# Patient Record
Sex: FEMALE | Race: WHITE | NOT HISPANIC OR LATINO | Employment: OTHER | URBAN - METROPOLITAN AREA
[De-identification: names, ages, dates, MRNs, and addresses within clinical notes are randomized per-mention and may not be internally consistent; named-entity substitution may affect disease eponyms.]

---

## 2023-03-01 LAB
ANION GAP IN SER/PLAS: 14 MMOL/L (ref 10–20)
CALCIUM (MG/DL) IN SER/PLAS: 8.7 MG/DL (ref 8.6–10.6)
CARBON DIOXIDE, TOTAL (MMOL/L) IN SER/PLAS: 43 MMOL/L (ref 21–32)
CHLORIDE (MMOL/L) IN SER/PLAS: 84 MMOL/L (ref 98–107)
CREATININE (MG/DL) IN SER/PLAS: 0.76 MG/DL (ref 0.5–1.05)
ERYTHROCYTE DISTRIBUTION WIDTH (RATIO) BY AUTOMATED COUNT: 14 % (ref 11.5–14.5)
ERYTHROCYTE MEAN CORPUSCULAR HEMOGLOBIN CONCENTRATION (G/DL) BY AUTOMATED: 29.8 G/DL (ref 32–36)
ERYTHROCYTE MEAN CORPUSCULAR VOLUME (FL) BY AUTOMATED COUNT: 96 FL (ref 80–100)
ERYTHROCYTES (10*6/UL) IN BLOOD BY AUTOMATED COUNT: 3.62 X10E12/L (ref 4–5.2)
GFR FEMALE: 77 ML/MIN/1.73M2
GLUCOSE (MG/DL) IN SER/PLAS: 123 MG/DL (ref 74–99)
HEMATOCRIT (%) IN BLOOD BY AUTOMATED COUNT: 34.9 % (ref 36–46)
HEMOGLOBIN (G/DL) IN BLOOD: 10.4 G/DL (ref 12–16)
LEUKOCYTES (10*3/UL) IN BLOOD BY AUTOMATED COUNT: 7.1 X10E9/L (ref 4.4–11.3)
NRBC (PER 100 WBCS) BY AUTOMATED COUNT: 0 /100 WBC (ref 0–0)
PLATELETS (10*3/UL) IN BLOOD AUTOMATED COUNT: 222 X10E9/L (ref 150–450)
POTASSIUM (MMOL/L) IN SER/PLAS: 3.8 MMOL/L (ref 3.5–5.3)
SODIUM (MMOL/L) IN SER/PLAS: 137 MMOL/L (ref 136–145)
THYROTROPIN (MIU/L) IN SER/PLAS BY DETECTION LIMIT <= 0.05 MIU/L: 3.39 MIU/L (ref 0.44–3.98)
UREA NITROGEN (MG/DL) IN SER/PLAS: 19 MG/DL (ref 6–23)

## 2024-01-13 ENCOUNTER — APPOINTMENT (OUTPATIENT)
Dept: CARDIOLOGY | Facility: HOSPITAL | Age: 84
DRG: 871 | End: 2024-01-13
Payer: MEDICARE

## 2024-01-13 ENCOUNTER — HOSPITAL ENCOUNTER (INPATIENT)
Facility: HOSPITAL | Age: 84
LOS: 8 days | Discharge: HOSPICE | DRG: 871 | End: 2024-01-21
Attending: STUDENT IN AN ORGANIZED HEALTH CARE EDUCATION/TRAINING PROGRAM | Admitting: INTERNAL MEDICINE
Payer: MEDICARE

## 2024-01-13 ENCOUNTER — APPOINTMENT (OUTPATIENT)
Dept: RADIOLOGY | Facility: HOSPITAL | Age: 84
DRG: 871 | End: 2024-01-13
Payer: MEDICARE

## 2024-01-13 DIAGNOSIS — J44.1 COPD EXACERBATION (MULTI): ICD-10-CM

## 2024-01-13 DIAGNOSIS — J18.9 PNEUMONIA DUE TO INFECTIOUS ORGANISM, UNSPECIFIED LATERALITY, UNSPECIFIED PART OF LUNG: ICD-10-CM

## 2024-01-13 DIAGNOSIS — R06.02 SHORTNESS OF BREATH: Primary | ICD-10-CM

## 2024-01-13 DIAGNOSIS — J96.02 ACUTE RESPIRATORY FAILURE WITH HYPERCAPNIA (MULTI): ICD-10-CM

## 2024-01-13 LAB
ALBUMIN SERPL BCP-MCNC: 3.6 G/DL (ref 3.4–5)
ALP SERPL-CCNC: 66 U/L (ref 33–136)
ALT SERPL W P-5'-P-CCNC: 6 U/L (ref 7–45)
ANION GAP SERPL CALC-SCNC: ABNORMAL MMOL/L
ARTERIAL PATENCY WRIST A: POSITIVE
AST SERPL W P-5'-P-CCNC: 16 U/L (ref 9–39)
BASE EXCESS BLDA CALC-SCNC: 33.8 MMOL/L (ref -2–3)
BASE EXCESS BLDV CALC-SCNC: 31.3 MMOL/L (ref -2–3)
BASOPHILS # BLD AUTO: 0.01 X10*3/UL (ref 0–0.1)
BASOPHILS NFR BLD AUTO: 0.2 %
BILIRUB SERPL-MCNC: 0.5 MG/DL (ref 0–1.2)
BODY TEMPERATURE: 37 DEGREES CELSIUS
BODY TEMPERATURE: 37 DEGREES CELSIUS
BUN SERPL-MCNC: 17 MG/DL (ref 6–23)
CALCIUM SERPL-MCNC: 8.3 MG/DL (ref 8.6–10.3)
CARDIAC TROPONIN I PNL SERPL HS: 15 NG/L (ref 0–13)
CARDIAC TROPONIN I PNL SERPL HS: 15 NG/L (ref 0–13)
CHLORIDE SERPL-SCNC: 83 MMOL/L (ref 98–107)
CO2 SERPL-SCNC: >45 MMOL/L (ref 21–32)
CREAT SERPL-MCNC: 0.76 MG/DL (ref 0.5–1.05)
CRITICAL CALL TIME: 1455
CRITICAL CALL TIME: 1648
CRITICAL CALLED BY: ABNORMAL
CRITICAL CALLED BY: ABNORMAL
CRITICAL CALLED TO: ABNORMAL
CRITICAL CALLED TO: ABNORMAL
CRITICAL READ BACK: ABNORMAL
CRITICAL READ BACK: ABNORMAL
EGFRCR SERPLBLD CKD-EPI 2021: 77 ML/MIN/1.73M*2
EOSINOPHIL # BLD AUTO: 0 X10*3/UL (ref 0–0.4)
EOSINOPHIL NFR BLD AUTO: 0 %
EPAP CMH2O: 5 CM H2O
ERYTHROCYTE [DISTWIDTH] IN BLOOD BY AUTOMATED COUNT: 15 % (ref 11.5–14.5)
FLUAV RNA RESP QL NAA+PROBE: NOT DETECTED
FLUBV RNA RESP QL NAA+PROBE: NOT DETECTED
FREQUENCY (BPM): 18 BPM
GLUCOSE SERPL-MCNC: 103 MG/DL (ref 74–99)
HCO3 BLDA-SCNC: 67.9 MMOL/L (ref 22–26)
HCO3 BLDV-SCNC: 65.4 MMOL/L (ref 22–26)
HCT VFR BLD AUTO: 39.8 % (ref 36–46)
HGB BLD-MCNC: 11.5 G/DL (ref 12–16)
IMM GRANULOCYTES # BLD AUTO: 0.01 X10*3/UL (ref 0–0.5)
IMM GRANULOCYTES NFR BLD AUTO: 0.2 % (ref 0–0.9)
INHALED O2 CONCENTRATION: 100 %
INHALED O2 CONCENTRATION: 40 %
INSPIRATORY TIME: 1
IPAP CMH2O: 15 CM H2O
LYMPHOCYTES # BLD AUTO: 0.24 X10*3/UL (ref 0.8–3)
LYMPHOCYTES NFR BLD AUTO: 5.4 %
MCH RBC QN AUTO: 29.6 PG (ref 26–34)
MCHC RBC AUTO-ENTMCNC: 28.9 G/DL (ref 32–36)
MCV RBC AUTO: 102 FL (ref 80–100)
MONOCYTES # BLD AUTO: 0.3 X10*3/UL (ref 0.05–0.8)
MONOCYTES NFR BLD AUTO: 6.8 %
NEUTROPHILS # BLD AUTO: 3.86 X10*3/UL (ref 1.6–5.5)
NEUTROPHILS NFR BLD AUTO: 87.4 %
NRBC BLD-RTO: 0 /100 WBCS (ref 0–0)
OXYHGB MFR BLDA: 96.6 % (ref 94–98)
OXYHGB MFR BLDV: 44.3 % (ref 45–75)
PCO2 BLDA: 123 MM HG (ref 38–42)
PCO2 BLDV: 124 MM HG (ref 41–51)
PH BLDA: 7.35 PH (ref 7.38–7.42)
PH BLDV: 7.33 PH (ref 7.33–7.43)
PLATELET # BLD AUTO: 194 X10*3/UL (ref 150–450)
PO2 BLDA: 335 MM HG (ref 85–95)
PO2 BLDV: 31 MM HG (ref 35–45)
POTASSIUM SERPL-SCNC: 3.9 MMOL/L (ref 3.5–5.3)
PROT SERPL-MCNC: 7.3 G/DL (ref 6.4–8.2)
RBC # BLD AUTO: 3.89 X10*6/UL (ref 4–5.2)
SAO2 % BLDA: 100 % (ref 94–100)
SAO2 % BLDV: 46 % (ref 45–75)
SARS-COV-2 RNA RESP QL NAA+PROBE: DETECTED
SODIUM SERPL-SCNC: 143 MMOL/L (ref 136–145)
SPECIMEN DRAWN FROM PATIENT: ABNORMAL
SPONTANEOUS TIDAL VOLUME: 271 ML
TEST COMMENT: ABNORMAL
TOTAL MINUTE VOLUME: 4.4 LITER
VENTILATOR MODE: ABNORMAL
VENTILATOR RATE: 8 BPM
WBC # BLD AUTO: 4.4 X10*3/UL (ref 4.4–11.3)

## 2024-01-13 PROCEDURE — 36600 WITHDRAWAL OF ARTERIAL BLOOD: CPT

## 2024-01-13 PROCEDURE — 71045 X-RAY EXAM CHEST 1 VIEW: CPT

## 2024-01-13 PROCEDURE — 80053 COMPREHEN METABOLIC PANEL: CPT | Performed by: STUDENT IN AN ORGANIZED HEALTH CARE EDUCATION/TRAINING PROGRAM

## 2024-01-13 PROCEDURE — 84484 ASSAY OF TROPONIN QUANT: CPT | Performed by: STUDENT IN AN ORGANIZED HEALTH CARE EDUCATION/TRAINING PROGRAM

## 2024-01-13 PROCEDURE — 2060000001 HC INTERMEDIATE ICU ROOM DAILY

## 2024-01-13 PROCEDURE — 2500000004 HC RX 250 GENERAL PHARMACY W/ HCPCS (ALT 636 FOR OP/ED): Performed by: STUDENT IN AN ORGANIZED HEALTH CARE EDUCATION/TRAINING PROGRAM

## 2024-01-13 PROCEDURE — 94640 AIRWAY INHALATION TREATMENT: CPT

## 2024-01-13 PROCEDURE — 96365 THER/PROPH/DIAG IV INF INIT: CPT

## 2024-01-13 PROCEDURE — 36415 COLL VENOUS BLD VENIPUNCTURE: CPT | Performed by: STUDENT IN AN ORGANIZED HEALTH CARE EDUCATION/TRAINING PROGRAM

## 2024-01-13 PROCEDURE — 87040 BLOOD CULTURE FOR BACTERIA: CPT | Mod: PARLAB | Performed by: STUDENT IN AN ORGANIZED HEALTH CARE EDUCATION/TRAINING PROGRAM

## 2024-01-13 PROCEDURE — 96375 TX/PRO/DX INJ NEW DRUG ADDON: CPT

## 2024-01-13 PROCEDURE — 96367 TX/PROPH/DG ADDL SEQ IV INF: CPT

## 2024-01-13 PROCEDURE — 87636 SARSCOV2 & INF A&B AMP PRB: CPT | Performed by: STUDENT IN AN ORGANIZED HEALTH CARE EDUCATION/TRAINING PROGRAM

## 2024-01-13 PROCEDURE — 85025 COMPLETE CBC W/AUTO DIFF WBC: CPT | Performed by: STUDENT IN AN ORGANIZED HEALTH CARE EDUCATION/TRAINING PROGRAM

## 2024-01-13 PROCEDURE — XW033E5 INTRODUCTION OF REMDESIVIR ANTI-INFECTIVE INTO PERIPHERAL VEIN, PERCUTANEOUS APPROACH, NEW TECHNOLOGY GROUP 5: ICD-10-PCS

## 2024-01-13 PROCEDURE — 93005 ELECTROCARDIOGRAM TRACING: CPT

## 2024-01-13 PROCEDURE — 71045 X-RAY EXAM CHEST 1 VIEW: CPT | Performed by: RADIOLOGY

## 2024-01-13 PROCEDURE — 96372 THER/PROPH/DIAG INJ SC/IM: CPT

## 2024-01-13 PROCEDURE — 86738 MYCOPLASMA ANTIBODY: CPT

## 2024-01-13 PROCEDURE — 2500000002 HC RX 250 W HCPCS SELF ADMINISTERED DRUGS (ALT 637 FOR MEDICARE OP, ALT 636 FOR OP/ED): Performed by: STUDENT IN AN ORGANIZED HEALTH CARE EDUCATION/TRAINING PROGRAM

## 2024-01-13 PROCEDURE — 3E0333Z INTRODUCTION OF ANTI-INFLAMMATORY INTO PERIPHERAL VEIN, PERCUTANEOUS APPROACH: ICD-10-PCS

## 2024-01-13 PROCEDURE — 99285 EMERGENCY DEPT VISIT HI MDM: CPT | Performed by: STUDENT IN AN ORGANIZED HEALTH CARE EDUCATION/TRAINING PROGRAM

## 2024-01-13 PROCEDURE — 2500000005 HC RX 250 GENERAL PHARMACY W/O HCPCS: Mod: JZ

## 2024-01-13 PROCEDURE — 84145 PROCALCITONIN (PCT): CPT | Mod: PARLAB

## 2024-01-13 PROCEDURE — 94660 CPAP INITIATION&MGMT: CPT

## 2024-01-13 PROCEDURE — 82805 BLOOD GASES W/O2 SATURATION: CPT | Performed by: STUDENT IN AN ORGANIZED HEALTH CARE EDUCATION/TRAINING PROGRAM

## 2024-01-13 PROCEDURE — 2500000004 HC RX 250 GENERAL PHARMACY W/ HCPCS (ALT 636 FOR OP/ED)

## 2024-01-13 PROCEDURE — 5A09357 ASSISTANCE WITH RESPIRATORY VENTILATION, LESS THAN 24 CONSECUTIVE HOURS, CONTINUOUS POSITIVE AIRWAY PRESSURE: ICD-10-PCS | Performed by: STUDENT IN AN ORGANIZED HEALTH CARE EDUCATION/TRAINING PROGRAM

## 2024-01-13 RX ORDER — DEXAMETHASONE SODIUM PHOSPHATE 10 MG/ML
6 INJECTION INTRAMUSCULAR; INTRAVENOUS EVERY 24 HOURS
Status: DISCONTINUED | OUTPATIENT
Start: 2024-01-13 | End: 2024-01-14

## 2024-01-13 RX ORDER — ACETAMINOPHEN 325 MG/1
650 TABLET ORAL EVERY 4 HOURS PRN
Status: DISCONTINUED | OUTPATIENT
Start: 2024-01-13 | End: 2024-01-21 | Stop reason: HOSPADM

## 2024-01-13 RX ORDER — GUAIFENESIN 600 MG/1
600 TABLET, EXTENDED RELEASE ORAL 2 TIMES DAILY PRN
Status: DISCONTINUED | OUTPATIENT
Start: 2024-01-13 | End: 2024-01-21 | Stop reason: HOSPADM

## 2024-01-13 RX ORDER — HEPARIN SODIUM 5000 [USP'U]/ML
5000 INJECTION, SOLUTION INTRAVENOUS; SUBCUTANEOUS EVERY 8 HOURS
Status: DISCONTINUED | OUTPATIENT
Start: 2024-01-13 | End: 2024-01-16

## 2024-01-13 RX ORDER — POLYETHYLENE GLYCOL 3350 17 G/17G
17 POWDER, FOR SOLUTION ORAL AS NEEDED
COMMUNITY
Start: 2019-10-29

## 2024-01-13 RX ORDER — FUROSEMIDE 40 MG/1
80 TABLET ORAL DAILY
Status: DISCONTINUED | OUTPATIENT
Start: 2024-01-13 | End: 2024-01-21 | Stop reason: HOSPADM

## 2024-01-13 RX ORDER — IPRATROPIUM BROMIDE AND ALBUTEROL SULFATE 2.5; .5 MG/3ML; MG/3ML
3 SOLUTION RESPIRATORY (INHALATION) EVERY 4 HOURS
COMMUNITY
Start: 2019-03-06

## 2024-01-13 RX ORDER — IPRATROPIUM BROMIDE AND ALBUTEROL SULFATE 2.5; .5 MG/3ML; MG/3ML
3 SOLUTION RESPIRATORY (INHALATION) EVERY 2 HOUR PRN
Status: DISCONTINUED | OUTPATIENT
Start: 2024-01-13 | End: 2024-01-21 | Stop reason: HOSPADM

## 2024-01-13 RX ORDER — FUROSEMIDE 40 MG/1
80 TABLET ORAL DAILY
COMMUNITY

## 2024-01-13 RX ORDER — ACETAMINOPHEN 160 MG/5ML
650 SOLUTION ORAL EVERY 4 HOURS PRN
Status: DISCONTINUED | OUTPATIENT
Start: 2024-01-13 | End: 2024-01-21 | Stop reason: HOSPADM

## 2024-01-13 RX ORDER — IPRATROPIUM BROMIDE AND ALBUTEROL SULFATE 2.5; .5 MG/3ML; MG/3ML
3 SOLUTION RESPIRATORY (INHALATION) EVERY 4 HOURS
Status: DISCONTINUED | OUTPATIENT
Start: 2024-01-13 | End: 2024-01-21 | Stop reason: HOSPADM

## 2024-01-13 RX ORDER — MEROPENEM 1 G/1
1 INJECTION, POWDER, FOR SOLUTION INTRAVENOUS EVERY 12 HOURS
Status: DISCONTINUED | OUTPATIENT
Start: 2024-01-13 | End: 2024-01-21 | Stop reason: HOSPADM

## 2024-01-13 RX ORDER — IPRATROPIUM BROMIDE AND ALBUTEROL SULFATE 2.5; .5 MG/3ML; MG/3ML
3 SOLUTION RESPIRATORY (INHALATION) ONCE
Status: COMPLETED | OUTPATIENT
Start: 2024-01-13 | End: 2024-01-13

## 2024-01-13 RX ORDER — POLYETHYLENE GLYCOL 3350 17 G/17G
17 POWDER, FOR SOLUTION ORAL DAILY PRN
Status: DISCONTINUED | OUTPATIENT
Start: 2024-01-13 | End: 2024-01-21 | Stop reason: HOSPADM

## 2024-01-13 RX ORDER — LEVOTHYROXINE SODIUM 50 UG/1
50 TABLET ORAL DAILY
Status: DISCONTINUED | OUTPATIENT
Start: 2024-01-13 | End: 2024-01-13

## 2024-01-13 RX ORDER — PANTOPRAZOLE SODIUM 40 MG/10ML
40 INJECTION, POWDER, LYOPHILIZED, FOR SOLUTION INTRAVENOUS
Status: DISCONTINUED | OUTPATIENT
Start: 2024-01-14 | End: 2024-01-21 | Stop reason: HOSPADM

## 2024-01-13 RX ORDER — PANTOPRAZOLE SODIUM 40 MG/1
40 TABLET, DELAYED RELEASE ORAL
Status: DISCONTINUED | OUTPATIENT
Start: 2024-01-14 | End: 2024-01-21 | Stop reason: HOSPADM

## 2024-01-13 RX ORDER — LEVOTHYROXINE SODIUM 50 UG/1
50 TABLET ORAL DAILY
Status: DISCONTINUED | OUTPATIENT
Start: 2024-01-14 | End: 2024-01-21 | Stop reason: HOSPADM

## 2024-01-13 RX ORDER — ACETAMINOPHEN 650 MG/1
650 SUPPOSITORY RECTAL EVERY 4 HOURS PRN
Status: DISCONTINUED | OUTPATIENT
Start: 2024-01-13 | End: 2024-01-21 | Stop reason: HOSPADM

## 2024-01-13 RX ORDER — LEVOTHYROXINE SODIUM 50 UG/1
50 TABLET ORAL DAILY
COMMUNITY

## 2024-01-13 RX ORDER — ALBUTEROL SULFATE 0.83 MG/ML
2.5 SOLUTION RESPIRATORY (INHALATION) EVERY 20 MIN
Status: COMPLETED | OUTPATIENT
Start: 2024-01-13 | End: 2024-01-13

## 2024-01-13 RX ORDER — VANCOMYCIN HYDROCHLORIDE 1 G/200ML
1000 INJECTION, SOLUTION INTRAVENOUS ONCE
Status: COMPLETED | OUTPATIENT
Start: 2024-01-13 | End: 2024-01-13

## 2024-01-13 RX ORDER — DOCUSATE SODIUM 100 MG/1
100 CAPSULE, LIQUID FILLED ORAL NIGHTLY
COMMUNITY
Start: 2019-10-29

## 2024-01-13 RX ORDER — MAGNESIUM SULFATE HEPTAHYDRATE 40 MG/ML
2 INJECTION, SOLUTION INTRAVENOUS ONCE
Status: COMPLETED | OUTPATIENT
Start: 2024-01-13 | End: 2024-01-13

## 2024-01-13 RX ADMIN — MEROPENEM 1 G: 1 INJECTION, POWDER, FOR SOLUTION INTRAVENOUS at 17:02

## 2024-01-13 RX ADMIN — REMDESIVIR 200 MG: 100 INJECTION, POWDER, LYOPHILIZED, FOR SOLUTION INTRAVENOUS at 21:03

## 2024-01-13 RX ADMIN — METHYLPREDNISOLONE SODIUM SUCCINATE 125 MG: 125 INJECTION, POWDER, FOR SOLUTION INTRAMUSCULAR; INTRAVENOUS at 14:57

## 2024-01-13 RX ADMIN — MAGNESIUM SULFATE HEPTAHYDRATE 2 G: 40 INJECTION, SOLUTION INTRAVENOUS at 14:57

## 2024-01-13 RX ADMIN — VANCOMYCIN HYDROCHLORIDE 1000 MG: 1 INJECTION, SOLUTION INTRAVENOUS at 17:20

## 2024-01-13 RX ADMIN — IPRATROPIUM BROMIDE AND ALBUTEROL SULFATE 3 ML: .5; 3 SOLUTION RESPIRATORY (INHALATION) at 14:47

## 2024-01-13 RX ADMIN — HEPARIN SODIUM 5000 UNITS: 5000 INJECTION INTRAVENOUS; SUBCUTANEOUS at 21:03

## 2024-01-13 RX ADMIN — IPRATROPIUM BROMIDE AND ALBUTEROL SULFATE 3 ML: .5; 3 SOLUTION RESPIRATORY (INHALATION) at 20:53

## 2024-01-13 RX ADMIN — ALBUTEROL SULFATE 2.5 MG: 2.5 SOLUTION RESPIRATORY (INHALATION) at 14:58

## 2024-01-13 RX ADMIN — DEXAMETHASONE SODIUM PHOSPHATE 6 MG: 10 INJECTION INTRAMUSCULAR; INTRAVENOUS at 21:03

## 2024-01-13 RX ADMIN — IPRATROPIUM BROMIDE AND ALBUTEROL SULFATE 3 ML: .5; 3 SOLUTION RESPIRATORY (INHALATION) at 23:41

## 2024-01-13 RX ADMIN — ALBUTEROL SULFATE 2.5 MG: 2.5 SOLUTION RESPIRATORY (INHALATION) at 14:51

## 2024-01-13 ASSESSMENT — PAIN SCALES - GENERAL: PAINLEVEL_OUTOF10: 0 - NO PAIN

## 2024-01-13 ASSESSMENT — COLUMBIA-SUICIDE SEVERITY RATING SCALE - C-SSRS
1. IN THE PAST MONTH, HAVE YOU WISHED YOU WERE DEAD OR WISHED YOU COULD GO TO SLEEP AND NOT WAKE UP?: NO
2. HAVE YOU ACTUALLY HAD ANY THOUGHTS OF KILLING YOURSELF?: NO
6. HAVE YOU EVER DONE ANYTHING, STARTED TO DO ANYTHING, OR PREPARED TO DO ANYTHING TO END YOUR LIFE?: NO

## 2024-01-13 ASSESSMENT — PAIN - FUNCTIONAL ASSESSMENT: PAIN_FUNCTIONAL_ASSESSMENT: 0-10

## 2024-01-13 NOTE — ED TRIAGE NOTES
Patient arrives from home with complaints of shortness of breath and possible COPD exacerbation.  Patient has been acting increasingly confused at home, with daughter, and they have had a hard time keeping her spo2 within range.  She wears 3l nc at home and cpap prn throughout the day but seems to be very short of breath.

## 2024-01-13 NOTE — ED PROVIDER NOTES
HPI   Chief Complaint   Patient presents with    Shortness of Breath    COPD     Patient arrives from home with complaints of shortness of breath and possible COPD exacerbation.  Patient has been acting increasingly confused at home, with daughter, and they have had a hard time keeping her spo2 within range.  She wears 3l nc at home and cpap prn throughout the day but seems to be very short of breath.       83-year-old female history of COPD on BiPAP at home up at home presented to the emergency department for hypoxemia and somnolence difficulty breathing.  Family has noticed that she has been hypoxic and not tolerating BiPAP is much at home.  Patient denies any chest pain shortness of breath difficulty breathing or any other muscle aches and pains.  Patient is not providing much history at all and denies all symptoms.  Independent historian daughter at bedside states patient is more somnolent.                          Ailyn Coma Scale Score: 14                  Patient History   Past Medical History:   Diagnosis Date    Diaphragmatic hernia without obstruction or gangrene 10/27/2019    Hiatal hernia    Diverticulosis of intestine, part unspecified, without perforation or abscess without bleeding 08/16/2016    Diverticulosis    Personal history of diseases of the blood and blood-forming organs and certain disorders involving the immune mechanism     History of anemia    Personal history of other diseases of the circulatory system     History of hypertension    Personal history of other diseases of the digestive system     History of gastroesophageal reflux (GERD)    Personal history of other diseases of the nervous system and sense organs     History of hearing loss    Personal history of other diseases of the respiratory system     History of asthma    Personal history of other diseases of the respiratory system     History of chronic obstructive lung disease    Personal history of other diseases of the  respiratory system     History of bronchitis    Personal history of other diseases of urinary system     H/O bladder problems    Personal history of other endocrine, nutritional and metabolic disease     History of hypothyroidism    Personal history of other endocrine, nutritional and metabolic disease     History of hyperlipidemia    Personal history of other endocrine, nutritional and metabolic disease     History of thyroid disorder    Personal history of other specified conditions     History of heartburn    Personal history of pneumonia (recurrent)     History of pneumonia    Shortness of breath     SOB (shortness of breath) on exertion    Unspecified urinary incontinence     Incontinence     Past Surgical History:   Procedure Laterality Date    OTHER SURGICAL HISTORY  06/28/2019    Tonsillectomy with adenoidectomy    TONSILLECTOMY  05/12/2016    Tonsillectomy     No family history on file.  Social History     Tobacco Use    Smoking status: Not on file    Smokeless tobacco: Not on file   Substance Use Topics    Alcohol use: Never    Drug use: Never       Physical Exam   ED Triage Vitals [01/13/24 1431]   Temp Heart Rate Resp BP   36.6 °C (97.9 °F) 96 18 160/72      SpO2 Temp Source Heart Rate Source Patient Position   100 % Temporal -- Sitting      BP Location FiO2 (%)     Right arm 100 %       Physical Exam  Vitals reviewed.   Constitutional:       Appearance: Normal appearance.   HENT:      Head: Normocephalic and atraumatic.      Nose: Nose normal.      Mouth/Throat:      Mouth: Mucous membranes are moist.   Eyes:      Extraocular Movements: Extraocular movements intact.      Conjunctiva/sclera: Conjunctivae normal.   Cardiovascular:      Rate and Rhythm: Normal rate and regular rhythm.      Pulses: Normal pulses.      Heart sounds: Normal heart sounds.   Pulmonary:      Effort: Pulmonary effort is normal.      Breath sounds: Wheezing, rhonchi and rales present.   Abdominal:      General: Abdomen is flat.  Bowel sounds are normal.      Palpations: Abdomen is soft.   Musculoskeletal:         General: Normal range of motion.   Skin:     General: Skin is warm and dry.   Neurological:      Mental Status: She is alert and oriented to person, place, and time. Mental status is at baseline.      Cranial Nerves: Cranial nerves 2-12 are intact.      Sensory: Sensation is intact.      Motor: Motor function is intact.   Psychiatric:         Mood and Affect: Mood normal.         ED Course & MDM   ED Course as of 01/13/24 1631   Sat Jan 13, 2024   1431 83-year-old female history of COPD on BiPAP presents emergency department for hypoxia and somnolence and difficulty breathing.  On examination patient was hypoxic 77% on 4 L nasal cannula.  Patient was subsequently placed on nonrebreather she has diffuse Rales and rhonchi.  Differential diagnosis includes COPD exacerbation pneumonia COVID-19 influenza EKG troponin chest x-ray CBC CMP breathing treatments have been ordered.  Patient will be placed on BiPAP and will routinely relator. [ZS]   1600 Patient had no leukocytosis initial troponin 15 [ZS]   1623 Chest x-ray my interpretation shows pneumonia IV meropenem and vancomycin.  Repeat blood gas is pending at this time. [ZS]   1627  CMP showed no remarkable pathology EKG showed sinus rhythm ventricular 105  QRS 70 QTc 436 no acute STEMI appreciated.  Breathing treatments and BiPAP have improved patient's respiratory status at this time. [ZS]      ED Course User Index  [ZS] Brijesh Jaime MD         Diagnoses as of 01/13/24 1631   Shortness of breath   COPD exacerbation (CMS/HCC)   Pneumonia due to infectious organism, unspecified laterality, unspecified part of lung   Acute respiratory failure with hypercapnia (CMS/HCC)       Medical Decision Making      Procedure  Critical Care    Performed by: Brjiesh Jaime MD  Authorized by: Brijesh Jaime MD    Critical care provider statement:     Critical care time (minutes):  20    Critical  care time was exclusive of:  Separately billable procedures and treating other patients    Critical care was necessary to treat or prevent imminent or life-threatening deterioration of the following conditions:  Respiratory failure    Critical care was time spent personally by me on the following activities:  Ordering and performing treatments and interventions, ordering and review of laboratory studies, ordering and review of radiographic studies, re-evaluation of patient's condition, pulse oximetry, development of treatment plan with patient or surrogate, evaluation of patient's response to treatment, examination of patient and obtaining history from patient or surrogate    Care discussed with: admitting provider         Brijesh Jaime MD  01/13/24 3840

## 2024-01-13 NOTE — PROGRESS NOTES
Pharmacy Medication History Review    Fidelina Milton is a 83 y.o. female admitted for No Principal Problem: There is no principal problem currently on the Problem List. Please update the Problem List and refresh.. Pharmacy reviewed the patient's [id number] medications and allergies for accuracy.    The list below reflectives the updated PTA list. Please review each medication in order reconciliation for additional clarification and justification.  (Not in a hospital admission)       The list below reflectives the updated allergy list. Please review each documented allergy for additional clarification and justification.  Allergies  Reviewed by Amanda Gaming CPhT on 1/13/2024        Severity Reactions Comments    Bactrim [sulfamethoxazole-trimethoprim] Low GI Upset     Cefadroxil Low GI Upset     Clarithromycin Low GI Upset     Codeine Low GI Upset     Erythromycin Low GI Upset             Below are additional concerns with the patient's PTA list.    See PTA med list    Amanda Gaming CPhT

## 2024-01-13 NOTE — PROGRESS NOTES
Transition of care note:    ED Course as of 01/13/24 1846   Sat Jan 13, 2024   1431 83-year-old female history of COPD on BiPAP presents emergency department for hypoxia and somnolence and difficulty breathing.  On examination patient was hypoxic 77% on 4 L nasal cannula.  Patient was subsequently placed on nonrebreather she has diffuse Rales and rhonchi.  Differential diagnosis includes COPD exacerbation pneumonia COVID-19 influenza EKG troponin chest x-ray CBC CMP breathing treatments have been ordered.  Patient will be placed on BiPAP and will routinely relator. [ZS]   1600 Patient had no leukocytosis initial troponin 15 [ZS]   1623 Chest x-ray my interpretation shows pneumonia IV meropenem and vancomycin.  Repeat blood gas is pending at this time. [ZS]   1627  CMP showed no remarkable pathology EKG showed sinus rhythm ventricular 105  QRS 70 QTc 436 no acute STEMI appreciated.  Breathing treatments and BiPAP have improved patient's respiratory status at this time. [ZS]   1843 Patient signed out to me at shift change pending repeat blood gas and admission.  This is an 83-year-old female who presented to the emergency department for shortness of breath and oxygen desaturation.  She has been requiring increased CPAP at home.  Patient's initial blood gas had shown a markedly elevated CO2 though appears to be compensated.  Patient been treated for pneumonia with findings on her x-ray.  Mental status had improved while patient was on BiPAP.  Patient was COVID-positive as well.  Patient's repeat blood gas with similar CO2 compared to prior.  This is likely chronic in nature given that she is not acidotic.  The BiPAP settings were able to be weaned but not able to come off as patient continued to desaturate when off the BiPAP.  Patient discussed with family as well as ICU.  At this time she is DNR/DNI and given that she will not be intubated ICU team attending does not feel patient requires a ICU which is  reasonable.  Patient will be admitted to stepdown under Dr. Wong who accepted the patient for admission. [DE]      ED Course User Index  [DE] Liang Sharpe MD  [ZS] Brijesh Jaime MD         Diagnoses as of 01/13/24 1846   Shortness of breath   COPD exacerbation (CMS/HCC)   Pneumonia due to infectious organism, unspecified laterality, unspecified part of lung   Acute respiratory failure with hypercapnia (CMS/HCC)      Vitals:    01/13/24 1800   BP: 127/60   Pulse: 101   Resp: 22   Temp:    SpO2: 93%     Results for orders placed or performed during the hospital encounter of 01/13/24 (from the past 24 hour(s))   Blood Gas, Arterial   Result Value Ref Range    POCT pH, Arterial 7.35 (L) 7.38 - 7.42 pH    POCT pCO2, Arterial 123 (HH) 38 - 42 mm Hg    POCT pO2, Arterial 335 (H) 85 - 95 mm Hg    POCT SO2, Arterial 100 94 - 100 %    POCT Oxy Hemoglobin, Arterial 96.6 94.0 - 98.0 %    POCT Base Excess, Arterial 33.8 (H) -2.0 - 3.0 mmol/L    POCT HCO3 Calculated, Arterial 67.9 (H) 22.0 - 26.0 mmol/L    Patient Temperature 37.0 degrees Celsius    FiO2 100 %    Ventilator Mode BiPAP     Ventilator Rate 8 bpm    Spontaneous Tidal Volume 271 mL    Total Minute Volume 4.4 Liter    Frequency (BPM) 18 bpm    Inspiratory Time 1.0     Ipap CMH2O 15.0 cm H2O    Epap CMH2O 5.0 cm H2O    Critical Called By AMY LOZADA [facility]     Critical Called To SARITA LUTHER MD     Critical Call Time 1455.0000     Critical Read Back Y     Site of Arterial Puncture Femoral Left     Ghulam's Test Positive    CBC and Auto Differential   Result Value Ref Range    WBC 4.4 4.4 - 11.3 x10*3/uL    nRBC 0.0 0.0 - 0.0 /100 WBCs    RBC 3.89 (L) 4.00 - 5.20 x10*6/uL    Hemoglobin 11.5 (L) 12.0 - 16.0 g/dL    Hematocrit 39.8 36.0 - 46.0 %     (H) 80 - 100 fL    MCH 29.6 26.0 - 34.0 pg    MCHC 28.9 (L) 32.0 - 36.0 g/dL    RDW 15.0 (H) 11.5 - 14.5 %    Platelets 194 150 - 450 x10*3/uL    Neutrophils % 87.4 40.0 - 80.0 %    Immature Granulocytes %, Automated 0.2  0.0 - 0.9 %    Lymphocytes % 5.4 13.0 - 44.0 %    Monocytes % 6.8 2.0 - 10.0 %    Eosinophils % 0.0 0.0 - 6.0 %    Basophils % 0.2 0.0 - 2.0 %    Neutrophils Absolute 3.86 1.60 - 5.50 x10*3/uL    Immature Granulocytes Absolute, Automated 0.01 0.00 - 0.50 x10*3/uL    Lymphocytes Absolute 0.24 (L) 0.80 - 3.00 x10*3/uL    Monocytes Absolute 0.30 0.05 - 0.80 x10*3/uL    Eosinophils Absolute 0.00 0.00 - 0.40 x10*3/uL    Basophils Absolute 0.01 0.00 - 0.10 x10*3/uL   Comprehensive metabolic panel   Result Value Ref Range    Glucose 103 (H) 74 - 99 mg/dL    Sodium 143 136 - 145 mmol/L    Potassium 3.9 3.5 - 5.3 mmol/L    Chloride 83 (L) 98 - 107 mmol/L    Bicarbonate >45 (HH) 21 - 32 mmol/L    Anion Gap      Urea Nitrogen 17 6 - 23 mg/dL    Creatinine 0.76 0.50 - 1.05 mg/dL    eGFR 77 >60 mL/min/1.73m*2    Calcium 8.3 (L) 8.6 - 10.3 mg/dL    Albumin 3.6 3.4 - 5.0 g/dL    Alkaline Phosphatase 66 33 - 136 U/L    Total Protein 7.3 6.4 - 8.2 g/dL    AST 16 9 - 39 U/L    Bilirubin, Total 0.5 0.0 - 1.2 mg/dL    ALT 6 (L) 7 - 45 U/L   Troponin I, High Sensitivity, Initial   Result Value Ref Range    Troponin I, High Sensitivity 15 (H) 0 - 13 ng/L   Troponin, High Sensitivity, 1 Hour   Result Value Ref Range    Troponin I, High Sensitivity 15 (H) 0 - 13 ng/L   Blood Gas Venous   Result Value Ref Range    POCT pH, Venous 7.33 7.33 - 7.43 pH    POCT pCO2, Venous 124 (HH) 41 - 51 mm Hg    POCT pO2, Venous 31 (L) 35 - 45 mm Hg    POCT SO2, Venous 46 45 - 75 %    POCT Oxy Hemoglobin, Venous 44.3 (L) 45.0 - 75.0 %    POCT Base Excess, Venous 31.3 (H) -2.0 - 3.0 mmol/L    POCT HCO3 Calculated, Venous 65.4 (H) 22.0 - 26.0 mmol/L    Patient Temperature 37.0 degrees Celsius    FiO2 40 %    Test Comment ICU-S     Critical Called By CANDIDO CASTRO, RRT     Critical Called To DR HARDING     Critical Call Time 1648.0000     Critical Read Back Y    Influenza A, and B PCR   Result Value Ref Range    Flu A Result Not Detected Not Detected    Flu B  Result Not Detected Not Detected   SARS-CoV-2 RT PCR   Result Value Ref Range    Coronavirus 2019, PCR Detected (A) Not Detected      Procedures

## 2024-01-13 NOTE — PROGRESS NOTES
Order was put in as 2G Q12H.  For pneumonia, the recommended dose is 1G q12H per the renal dosing guideline. Patient's CrCl is 36.8 mL/min.

## 2024-01-13 NOTE — PROGRESS NOTES
ICU attending:    I was called by ED attending to assess this patient.  Patient is 83-year-old who is very hard of hearing and on BiPAP, I did discuss with patient's  and POA above as well as patient's daughter p.m. at bedside.   Patient is 83-year-old female with advanced chronic obstructive pulmonary disease who has been on home NIV for about 4 years, history of hypoxemic respiratory failure on 3 L nasal cannula during the day/when not on NIV.  The daughter stated they have been having trouble with her oxygenation for last 2 days where her oxygen saturation drops to the 60s, family also reports patient has been getting more fatigue and tired, more lethargic and confused, and has not been eating or drinking much.  Family eventually decided to bring patient to ED, venous blood gas showed pCO2 of 123 with pH of 7.33.  Chest x-ray shows consolidation consistent with pneumonia, and per report patient tested positive for COVID-19.  Family advised me patient had COVID-19 infection back in November 2023.  Patient is currently on BiPAP 15 over 5 cm H2O and 40% FiO2 with oxygen saturation in the high 90s.  Baseline pCO2 appears to be in the 110s range.  I discussed goals of care and CODE STATUS with patient, her /POA Ezequiel and her daughter Radha, family and patient would like CODE STATUS to be DNR CCA, DO NOT INTUBATE.   Patient can be admitted to stepdown unit with continuous NIV for now, treat for COVID-19 pneumonia and possible bacterial pneumonia..  Above plan was discussed with ED attending.

## 2024-01-13 NOTE — PROGRESS NOTES
"Vancomycin Dosing by Pharmacy- INITIAL    Fidelina Milton is a 83 y.o. year old female who Pharmacy has been consulted for vancomycin dosing for pneumonia. Based on the patient's indication and renal status this patient will be dosed based on a goal AUC of 400-600.     Renal function is currently stable.  Crcl = 36.8 ml/min    Visit Vitals  /75   Pulse 97   Temp 36.6 °C (97.9 °F) (Temporal)   Resp 23        Lab Results   Component Value Date    CREATININE 0.76 01/13/2024    CREATININE 0.76 03/01/2023    CREATININE 0.53 05/01/2020    CREATININE 0.61 11/25/2019    CREATININE 0.76 10/27/2019        Patient weight is 43.1 kg    No results found for: \"CULTURE\"     No intake/output data recorded.  [unfilled]    Lab Results   Component Value Date    PATIENTTEMP 37.0 01/13/2024    PATIENTTEMP 37.0 01/13/2024          Assessment/Plan     Patient will be given a loading dose of 1000 mg.    This is a 1 time vanco order for the ED           Karyn Odom, PharmD       "

## 2024-01-14 LAB
ERYTHROCYTE [DISTWIDTH] IN BLOOD BY AUTOMATED COUNT: 15.1 % (ref 11.5–14.5)
HCT VFR BLD AUTO: 35.7 % (ref 36–46)
HGB BLD-MCNC: 10.3 G/DL (ref 12–16)
MCH RBC QN AUTO: 29.4 PG (ref 26–34)
MCHC RBC AUTO-ENTMCNC: 28.9 G/DL (ref 32–36)
MCV RBC AUTO: 102 FL (ref 80–100)
MRSA DNA SPEC QL NAA+PROBE: NOT DETECTED
NRBC BLD-RTO: 0 /100 WBCS (ref 0–0)
PLATELET # BLD AUTO: 175 X10*3/UL (ref 150–450)
RBC # BLD AUTO: 3.5 X10*6/UL (ref 4–5.2)
VANCOMYCIN TROUGH SERPL-MCNC: 12.5 UG/ML (ref 5–20)
WBC # BLD AUTO: 2.9 X10*3/UL (ref 4.4–11.3)

## 2024-01-14 PROCEDURE — 85027 COMPLETE CBC AUTOMATED: CPT

## 2024-01-14 PROCEDURE — 2500000004 HC RX 250 GENERAL PHARMACY W/ HCPCS (ALT 636 FOR OP/ED)

## 2024-01-14 PROCEDURE — 2500000002 HC RX 250 W HCPCS SELF ADMINISTERED DRUGS (ALT 637 FOR MEDICARE OP, ALT 636 FOR OP/ED): Performed by: STUDENT IN AN ORGANIZED HEALTH CARE EDUCATION/TRAINING PROGRAM

## 2024-01-14 PROCEDURE — 87641 MR-STAPH DNA AMP PROBE: CPT | Performed by: STUDENT IN AN ORGANIZED HEALTH CARE EDUCATION/TRAINING PROGRAM

## 2024-01-14 PROCEDURE — 2500000004 HC RX 250 GENERAL PHARMACY W/ HCPCS (ALT 636 FOR OP/ED): Performed by: STUDENT IN AN ORGANIZED HEALTH CARE EDUCATION/TRAINING PROGRAM

## 2024-01-14 PROCEDURE — 96372 THER/PROPH/DIAG INJ SC/IM: CPT

## 2024-01-14 PROCEDURE — 2500000005 HC RX 250 GENERAL PHARMACY W/O HCPCS: Performed by: INTERNAL MEDICINE

## 2024-01-14 PROCEDURE — 2500000004 HC RX 250 GENERAL PHARMACY W/ HCPCS (ALT 636 FOR OP/ED): Performed by: INTERNAL MEDICINE

## 2024-01-14 PROCEDURE — 94640 AIRWAY INHALATION TREATMENT: CPT

## 2024-01-14 PROCEDURE — 80202 ASSAY OF VANCOMYCIN: CPT

## 2024-01-14 PROCEDURE — 94660 CPAP INITIATION&MGMT: CPT

## 2024-01-14 PROCEDURE — 84132 ASSAY OF SERUM POTASSIUM: CPT | Performed by: INTERNAL MEDICINE

## 2024-01-14 PROCEDURE — 2060000001 HC INTERMEDIATE ICU ROOM DAILY

## 2024-01-14 PROCEDURE — 36415 COLL VENOUS BLD VENIPUNCTURE: CPT

## 2024-01-14 PROCEDURE — 36600 WITHDRAWAL OF ARTERIAL BLOOD: CPT

## 2024-01-14 RX ORDER — DEXAMETHASONE SODIUM PHOSPHATE 10 MG/ML
6 INJECTION INTRAMUSCULAR; INTRAVENOUS EVERY 12 HOURS
Status: DISCONTINUED | OUTPATIENT
Start: 2024-01-14 | End: 2024-01-21 | Stop reason: HOSPADM

## 2024-01-14 RX ORDER — VANCOMYCIN HYDROCHLORIDE 750 MG/150ML
750 INJECTION, SOLUTION INTRAVENOUS EVERY 24 HOURS
Status: DISCONTINUED | OUTPATIENT
Start: 2024-01-14 | End: 2024-01-15

## 2024-01-14 RX ADMIN — IPRATROPIUM BROMIDE AND ALBUTEROL SULFATE 3 ML: .5; 3 SOLUTION RESPIRATORY (INHALATION) at 02:56

## 2024-01-14 RX ADMIN — HEPARIN SODIUM 5000 UNITS: 5000 INJECTION INTRAVENOUS; SUBCUTANEOUS at 03:25

## 2024-01-14 RX ADMIN — VANCOMYCIN HYDROCHLORIDE 750 MG: 750 INJECTION, SOLUTION INTRAVENOUS at 16:45

## 2024-01-14 RX ADMIN — IPRATROPIUM BROMIDE AND ALBUTEROL SULFATE 3 ML: .5; 3 SOLUTION RESPIRATORY (INHALATION) at 14:07

## 2024-01-14 RX ADMIN — MEROPENEM 1 G: 1 INJECTION, POWDER, FOR SOLUTION INTRAVENOUS at 21:08

## 2024-01-14 RX ADMIN — HEPARIN SODIUM 5000 UNITS: 5000 INJECTION INTRAVENOUS; SUBCUTANEOUS at 18:43

## 2024-01-14 RX ADMIN — DEXAMETHASONE SODIUM PHOSPHATE 6 MG: 10 INJECTION INTRAMUSCULAR; INTRAVENOUS at 12:27

## 2024-01-14 RX ADMIN — Medication: at 08:10

## 2024-01-14 RX ADMIN — PANTOPRAZOLE SODIUM 40 MG: 40 TABLET, DELAYED RELEASE ORAL at 06:28

## 2024-01-14 RX ADMIN — MEROPENEM 1 G: 1 INJECTION, POWDER, FOR SOLUTION INTRAVENOUS at 06:28

## 2024-01-14 RX ADMIN — HEPARIN SODIUM 5000 UNITS: 5000 INJECTION INTRAVENOUS; SUBCUTANEOUS at 11:27

## 2024-01-14 RX ADMIN — IPRATROPIUM BROMIDE AND ALBUTEROL SULFATE 3 ML: .5; 3 SOLUTION RESPIRATORY (INHALATION) at 20:40

## 2024-01-14 RX ADMIN — IPRATROPIUM BROMIDE AND ALBUTEROL SULFATE 3 ML: .5; 3 SOLUTION RESPIRATORY (INHALATION) at 10:15

## 2024-01-14 RX ADMIN — IPRATROPIUM BROMIDE AND ALBUTEROL SULFATE 3 ML: .5; 3 SOLUTION RESPIRATORY (INHALATION) at 08:10

## 2024-01-14 RX ADMIN — Medication 100 MG: at 18:43

## 2024-01-14 SDOH — SOCIAL STABILITY: SOCIAL INSECURITY: HAS ANYONE EVER THREATENED TO HURT YOUR FAMILY OR YOUR PETS?: UNABLE TO ASSESS

## 2024-01-14 SDOH — SOCIAL STABILITY: SOCIAL INSECURITY: DOES ANYONE TRY TO KEEP YOU FROM HAVING/CONTACTING OTHER FRIENDS OR DOING THINGS OUTSIDE YOUR HOME?: UNABLE TO ASSESS

## 2024-01-14 SDOH — SOCIAL STABILITY: SOCIAL INSECURITY: HAVE YOU HAD THOUGHTS OF HARMING ANYONE ELSE?: NO

## 2024-01-14 SDOH — SOCIAL STABILITY: SOCIAL INSECURITY: ARE THERE ANY APPARENT SIGNS OF INJURIES/BEHAVIORS THAT COULD BE RELATED TO ABUSE/NEGLECT?: UNABLE TO ASSESS

## 2024-01-14 SDOH — SOCIAL STABILITY: SOCIAL INSECURITY: DO YOU FEEL UNSAFE GOING BACK TO THE PLACE WHERE YOU ARE LIVING?: UNABLE TO ASSESS

## 2024-01-14 SDOH — SOCIAL STABILITY: SOCIAL INSECURITY: ARE YOU OR HAVE YOU BEEN THREATENED OR ABUSED PHYSICALLY, EMOTIONALLY, OR SEXUALLY BY ANYONE?: UNABLE TO ASSESS

## 2024-01-14 SDOH — SOCIAL STABILITY: SOCIAL INSECURITY: ABUSE: ADULT

## 2024-01-14 SDOH — SOCIAL STABILITY: SOCIAL INSECURITY: DO YOU FEEL ANYONE HAS EXPLOITED OR TAKEN ADVANTAGE OF YOU FINANCIALLY OR OF YOUR PERSONAL PROPERTY?: UNABLE TO ASSESS

## 2024-01-14 ASSESSMENT — COGNITIVE AND FUNCTIONAL STATUS - GENERAL
EATING MEALS: A LITTLE
MOVING FROM LYING ON BACK TO SITTING ON SIDE OF FLAT BED WITH BEDRAILS: A LOT
MOVING FROM LYING ON BACK TO SITTING ON SIDE OF FLAT BED WITH BEDRAILS: A LOT
DRESSING REGULAR UPPER BODY CLOTHING: TOTAL
STANDING UP FROM CHAIR USING ARMS: TOTAL
EATING MEALS: A LITTLE
CLIMB 3 TO 5 STEPS WITH RAILING: TOTAL
TURNING FROM BACK TO SIDE WHILE IN FLAT BAD: TOTAL
TOILETING: TOTAL
TURNING FROM BACK TO SIDE WHILE IN FLAT BAD: TOTAL
MOBILITY SCORE: 7
MOBILITY SCORE: 7
WALKING IN HOSPITAL ROOM: TOTAL
CLIMB 3 TO 5 STEPS WITH RAILING: TOTAL
DRESSING REGULAR LOWER BODY CLOTHING: TOTAL
PERSONAL GROOMING: TOTAL
DAILY ACTIVITIY SCORE: 8
DRESSING REGULAR UPPER BODY CLOTHING: TOTAL
DRESSING REGULAR LOWER BODY CLOTHING: TOTAL
DAILY ACTIVITIY SCORE: 8
MOVING TO AND FROM BED TO CHAIR: TOTAL
STANDING UP FROM CHAIR USING ARMS: TOTAL
PATIENT BASELINE BEDBOUND: YES
TOILETING: TOTAL
HELP NEEDED FOR BATHING: TOTAL
WALKING IN HOSPITAL ROOM: TOTAL
PERSONAL GROOMING: TOTAL
HELP NEEDED FOR BATHING: TOTAL
MOVING TO AND FROM BED TO CHAIR: TOTAL

## 2024-01-14 ASSESSMENT — ACTIVITIES OF DAILY LIVING (ADL)
JUDGMENT_ADEQUATE_SAFELY_COMPLETE_DAILY_ACTIVITIES: NO
ADEQUATE_TO_COMPLETE_ADL: NO
FEEDING YOURSELF: DEPENDENT
LACK_OF_TRANSPORTATION: PATIENT UNABLE TO ANSWER
GROOMING: DEPENDENT
DRESSING YOURSELF: DEPENDENT
HEARING - LEFT EAR: DIFFICULTY WITH NOISE
BATHING: DEPENDENT
TOILETING: DEPENDENT
PATIENT'S MEMORY ADEQUATE TO SAFELY COMPLETE DAILY ACTIVITIES?: YES
HEARING - RIGHT EAR: DIFFICULTY WITH NOISE
WALKS IN HOME: DEPENDENT
LACK_OF_TRANSPORTATION: PATIENT UNABLE TO ANSWER

## 2024-01-14 ASSESSMENT — PAIN SCALES - GENERAL
PAINLEVEL_OUTOF10: 0 - NO PAIN

## 2024-01-14 ASSESSMENT — LIFESTYLE VARIABLES
HAVE YOU EVER FELT YOU SHOULD CUT DOWN ON YOUR DRINKING: NO
HOW OFTEN DO YOU HAVE 6 OR MORE DRINKS ON ONE OCCASION: PATIENT UNABLE TO ANSWER
REASON UNABLE TO ASSESS: NO
AUDIT-C TOTAL SCORE: -1
HOW MANY STANDARD DRINKS CONTAINING ALCOHOL DO YOU HAVE ON A TYPICAL DAY: PATIENT UNABLE TO ANSWER
SKIP TO QUESTIONS 9-10: 0
HOW OFTEN DO YOU HAVE A DRINK CONTAINING ALCOHOL: PATIENT UNABLE TO ANSWER
EVER FELT BAD OR GUILTY ABOUT YOUR DRINKING: NO
AUDIT-C TOTAL SCORE: -1
HAVE PEOPLE ANNOYED YOU BY CRITICIZING YOUR DRINKING: NO
EVER HAD A DRINK FIRST THING IN THE MORNING TO STEADY YOUR NERVES TO GET RID OF A HANGOVER: NO

## 2024-01-14 ASSESSMENT — PAIN - FUNCTIONAL ASSESSMENT
PAIN_FUNCTIONAL_ASSESSMENT: 0-10
PAIN_FUNCTIONAL_ASSESSMENT: 0-10

## 2024-01-14 ASSESSMENT — PATIENT HEALTH QUESTIONNAIRE - PHQ9
SUM OF ALL RESPONSES TO PHQ9 QUESTIONS 1 & 2: 0
2. FEELING DOWN, DEPRESSED OR HOPELESS: NOT AT ALL
1. LITTLE INTEREST OR PLEASURE IN DOING THINGS: NOT AT ALL

## 2024-01-14 NOTE — PROGRESS NOTES
"Vancomycin Dosing by Pharmacy- FOLLOW UP    Fidelina Milton is a 83 y.o. year old female who Pharmacy has been consulted for vancomycin dosing for pneumonia. Based on the patient's indication and renal status this patient is being dosed based on a goal AUC of 400-600.     Renal function is currently stable.    Current vancomycin dose: 1000 mg given     MRSA pending     Visit Vitals  /61   Pulse 106   Temp 36.4 °C (97.5 °F) (Temporal)   Resp 20        Lab Results   Component Value Date    CREATININE 0.76 01/13/2024    CREATININE 0.76 03/01/2023    CREATININE 0.53 05/01/2020    CREATININE 0.61 11/25/2019    CREATININE 0.76 10/27/2019        Patient weight is No results found for: \"PTWEIGHT\"    No results found for: \"CULTURE\"     I/O last 3 completed shifts:  In: 300 (7 mL/kg) [IV Piggyback:300]  Out: - (0 mL/kg)   Weight: 43.1 kg   [unfilled]    Lab Results   Component Value Date    PATIENTTEMP 37.0 01/13/2024    PATIENTTEMP 37.0 01/13/2024        Assessment/Plan    Within goal AUC range. Continue current vancomycin regimen.    This dosing regimen is predicted by [employer] to result in the following pharmacokinetic parameters:  Regimen: 750 mg IV every 24 hours.  Start time: 07:29 on 01/14/2024  Exposure target: AUC24 (range)400-600 mg/L.hr   AUC24,ss: 450 mg/L.hr  Probability of AUC24 > 400: 72 %  Ctrough,ss: 13 mg/L  Probability of Ctrough,ss > 20: 6 %  Probability of nephrotoxicity (Lodise AUDIE 2009): 8 %      The next level will be obtained on 1/15 at AM labs. May be obtained sooner if clinically indicated.   Will continue to monitor renal function daily while on vancomycin and order serum creatinine at least every 48 hours if not already ordered.  Follow for continued vancomycin needs, clinical response, and signs/symptoms of toxicity.       Gladys Spears, PharmD           "

## 2024-01-14 NOTE — H&P
History and Physical     PATIENT NAME: Fidelina Milton  MRN: [id number]    SERVICE DATE:  1/13/2024  SERVICE TIME:  7:30 PM    Chief Complaint     Patient is a 83 y.o. female admitted on 1/13/2024  2:30 PM  with chief complaint of Somnolence.     History of Presenting Illness     Patient is a 83 y.o. female w. PMHx of: Hypothyroidism, asthma, hypertension, GERD, MGUS, chronic anemia, hyperlipidemia, history of COPD on BiPAP presenting to [facility] from home due to hypoxemia and somnolence with difficulty of breathing.  History was obtained from family at bedside family had noticed that patient had been hypoxemic and not tolerating her BiPAP as much at home which prompted them to come to the emergency department for further evaluation.  Patient is a rather poor historian and unable to provide me with any meaningful information due to her current mental state.  Given concern for hypoxic respiratory failure, CT was consulted however at this time patient's CODE STATUS has been changed to DNR CCA and DO NOT INTUBATE.  Per chart review, patient has advanced COPD and has been on noninvasive ventilation for about 4 years with hypoxemic respiratory failure on 3 L nasal cannula throughout the day and BiPAP at night.  Per family, they are having trouble oxygenating the patient for the last few days and noted her O2 saturation has been dropping to the 60s and has been getting much more fatigued and lethargic and confused which prompted her not to be eating or drinking as much.  While patient was in the emergency department, venous blood gas showed pCO2 of 123 with pH 7.33.  Chest x-ray shows consolidation consistent with pneumonia and patient tested positive for COVID-19 today.  There is also noted that patient tested positive for COVID-19 back in November 2023.  Currently patient is on BiPAP 15/5 with 40% FiO2 saturating in the 90s.  At this time patient will be admitted to stepdown unit and I was asked to do the admission  H&P.    Review of Systems      Unable to be obtained from patient    Family/Medical/Surgical/Social Hx     No family history on file.  Past Medical History:   Diagnosis Date    Diaphragmatic hernia without obstruction or gangrene 10/27/2019    Hiatal hernia    Diverticulosis of intestine, part unspecified, without perforation or abscess without bleeding 08/16/2016    Diverticulosis    Personal history of diseases of the blood and blood-forming organs and certain disorders involving the immune mechanism     History of anemia    Personal history of other diseases of the circulatory system     History of hypertension    Personal history of other diseases of the digestive system     History of gastroesophageal reflux (GERD)    Personal history of other diseases of the nervous system and sense organs     History of hearing loss    Personal history of other diseases of the respiratory system     History of asthma    Personal history of other diseases of the respiratory system     History of chronic obstructive lung disease    Personal history of other diseases of the respiratory system     History of bronchitis    Personal history of other diseases of urinary system     H/O bladder problems    Personal history of other endocrine, nutritional and metabolic disease     History of hypothyroidism    Personal history of other endocrine, nutritional and metabolic disease     History of hyperlipidemia    Personal history of other endocrine, nutritional and metabolic disease     History of thyroid disorder    Personal history of other specified conditions     History of heartburn    Personal history of pneumonia (recurrent)     History of pneumonia    Shortness of breath     SOB (shortness of breath) on exertion    Unspecified urinary incontinence     Incontinence     Past Surgical History:   Procedure Laterality Date    OTHER SURGICAL HISTORY  06/28/2019    Tonsillectomy with adenoidectomy    TONSILLECTOMY  05/12/2016     "Tonsillectomy     Social History     Socioeconomic History    Marital status:      Spouse name: Not on file    Number of children: Not on file    Years of education: Not on file    Highest education level: Not on file   Occupational History    Not on file   Tobacco Use    Smoking status: Not on file    Smokeless tobacco: Not on file   Substance and Sexual Activity    Alcohol use: Never    Drug use: Never    Sexual activity: Not on file   Other Topics Concern    Not on file   Social History Narrative    Not on file     Social Determinants of Health     Financial Resource Strain: Not on file   Food Insecurity: Not on file   Transportation Needs: Not on file   Physical Activity: Not on file   Stress: Not on file   Social Connections: Not on file   Intimate Partner Violence: Not on file   Housing Stability: Not on file       Medications/Allergies     @[facility]@  Bactrim [sulfamethoxazole-trimethoprim], Cefadroxil, Clarithromycin, Codeine, and Erythromycin    Physical Exam     /63 (BP Location: Left arm, Patient Position: Sitting)   Pulse 98   Temp 36.6 °C (97.9 °F) (Temporal)   Resp 16   Ht 1.575 m (5' 2\")   Wt (!) 43.1 kg (95 lb)   SpO2 97%   BMI 17.38 kg/m²   General: Elderly female appearing very somnolent  HEENT: PERRLA. EOMi.  Neck: No JVD. No LAD.  CV: Normal rate and rhythm. No murmurs or rubs auscultated.   Resp: Bilateral expiratory wheezing noted  Abdomen: Soft, nontender, nondistended abdomen. BSx4.   Extremities: No pedal edema b/l.      Data      CBC -  Lab Results   Component Value Date    WBC 4.4 01/13/2024    HGB 11.5 (L) 01/13/2024    HCT 39.8 01/13/2024     (H) 01/13/2024     01/13/2024       CMP -  Lab Results   Component Value Date    CALCIUM 8.3 (L) 01/13/2024    PROT 7.3 01/13/2024    ALBUMIN 3.6 01/13/2024    AST 16 01/13/2024    ALT 6 (L) 01/13/2024    ALKPHOS 66 01/13/2024    BILITOT 0.5 01/13/2024       LIPID PANEL -   No results found for: \"CHOL\", \"TRIG\", " "\"HDL\", \"CHHDL\", \"LDLF\", \"VLDL\", \"NHDL\"    RENAL FUNCTION PANEL -   Lab Results   Component Value Date    GLUCOSE 103 (H) 01/13/2024     01/13/2024    K 3.9 01/13/2024    CL 83 (L) 01/13/2024    CO2 >45 (HH) 01/13/2024    ANIONGAP  01/13/2024      Comment:      One or more analytes used in this calculation is outside of the analytical measurement range. Calculation cannot be performed.    BUN 17 01/13/2024    CREATININE 0.76 01/13/2024    CALCIUM 8.3 (L) 01/13/2024    ALBUMIN 3.6 01/13/2024        Lab Results   Component Value Date    TROPHS 15 (H) 01/13/2024         Assessment and Plan     Patient is a 83 y.o. female w. PMHx of: Hypothyroidism, asthma, hypertension, GERD, MGUS, chronic anemia, hyperlipidemia, history of COPD on BiPAP presenting to [facility] from home due to hypoxemia and somnolence with difficulty of breathing.    # COPD  #Acute on chronic hypoxic respiratory failure  #COVID-19 diagnosis  #Underlying pneumonia secondary to above  #Metabolic encephalopathy secondary to above      Plan  -Patient started on remdesivir, Decadron 6 mg every day  -Patient was started on meropenem and vancomycin in the emergency department.  Will continue with antibiotics at this time  -DuoNebs every 4 hours, mucolytic therapy with Mucinex  -Plan to continue patient on noninvasive ventilation overnight  -Strep pneumo, Legionella, mycoplasma, sputum cultures pending  -Encourage incentive spirometry use as tolerated  -Aggressive bronchopulmonary hygiene.  Respiratory consulted      # Hypothyroidism    Plan  -Continue Synthroid    # Hypertension  Plan  -Patient on Lasix 80 mg at home.  Will hold for now in the setting of soft blood pressures.        Chronic Medical Problems  # History of MGUS  # GERD  # Chronic anemia      # VTE PPx: Heparin  # GI PPx: Protonix  # Diet: Regular diet  # Dispo: Stepdown  # Code Status: DNR, DNI    Dr. Rogers Ac  Internal Medicine PGY-2  January 13, 2024 7:30 PM  Patient fully evaluated " and plan as above

## 2024-01-14 NOTE — PROGRESS NOTES
"Vancomycin Dosing by Pharmacy- FOLLOW UP    Fidelina Milton is a 83 y.o. year old female who Pharmacy has been consulted for vancomycin dosing for pneumonia. Based on the patient's indication and renal status this patient is being dosed based on a goal AUC of 400-600.     Renal function is currently stable.  Crcl = 36.8 ml/min    Current vancomycin dose: 1000 mg given x 1 dose today at 1845        Visit Vitals  /63 (BP Location: Left arm, Patient Position: Sitting)   Pulse 98   Temp 36.6 °C (97.9 °F) (Temporal)   Resp 16        Lab Results   Component Value Date    CREATININE 0.76 01/13/2024    CREATININE 0.76 03/01/2023    CREATININE 0.53 05/01/2020    CREATININE 0.61 11/25/2019    CREATININE 0.76 10/27/2019        Patient weight is 43.1 kg    No results found for: \"CULTURE\"     I/O last 3 completed shifts:  In: 100 (2.3 mL/kg) [IV Piggyback:100]  Out: - (0 mL/kg)   Weight: 43.1 kg   [unfilled]    Lab Results   Component Value Date    PATIENTTEMP 37.0 01/13/2024    PATIENTTEMP 37.0 01/13/2024        Assessment/Plan      The next level will be obtained on 1/14 at 0500. May be obtained sooner if clinically indicated.   Will continue to monitor renal function daily while on vancomycin and order serum creatinine at least every 48 hours if not already ordered.  Follow for continued vancomycin needs, clinical response, and signs/symptoms of toxicity.       Karyn Odom, PharmD           "

## 2024-01-14 NOTE — PROGRESS NOTES
Fidelina Milton is a 83 y.o. female on day 1 of admission presenting with Shortness of breath.      Subjective   Patient fully evaluated on January 14 and clinically improved       Objective     Last Recorded Vitals  /63   Pulse 106   Temp 36.2 °C (97.2 °F)   Resp 20   Wt (!) 43.1 kg (95 lb)   SpO2 92%   Intake/Output last 3 Shifts:    Intake/Output Summary (Last 24 hours) at 1/14/2024 1520  Last data filed at 1/14/2024 1042  Gross per 24 hour   Intake 450 ml   Output --   Net 450 ml       Admission Weight  Weight: (!) 43.1 kg (95 lb) (01/13/24 1431)    Daily Weight  01/13/24 : (!) 43.1 kg (95 lb)    Image Results  XR chest 1 view  Narrative: Interpreted By:  Janak Roche,   STUDY:  XR CHEST 1 VIEW;  1/13/2024 3:36 pm      INDICATION:  Signs/Symptoms:sob.      COMPARISON:  05/29/2020      ACCESSION NUMBER(S):  [id number]      ORDERING CLINICIAN:  HOMA HARDING      FINDINGS:  Please see the impression      Impression: 1.  Extensive chronic interstitial opacities in the bilateral lungs.  The possibility of superimposed airspace disease particular in the  upper lobes can not be excluded.  2. COPD.  3. Persistent blunting of the left costophrenic angle.  4. Cardiac silhouette.  5. No pneumothorax.  6. Multilevel thoracic spondylosis.  7. Diffuse osteopenia.              MACRO:  None      Signed by: Janak Roche 1/13/2024 4:17 PM  Dictation workstation:   [id number]      Physical Exam    Relevant Results               Assessment/Plan   This patient currently has cardiac telemetry ordered; if you would like to modify or discontinue the telemetry order, click here to go to the orders activity to modify/discontinue the order.              Principal Problem:    Shortness of breath          Ren Wong MD  Physician  Internal Medicine     H&P      Signed     Date of Service: 1/13/2024  7:29 PM     Signed       Expand All Collapse All         History and Physical      PATIENT NAME: Fidelina Milton  MRN:  [id number]     SERVICE DATE:  1/13/2024  SERVICE TIME:  7:30 PM     Chief Complaint      Patient is a 83 y.o. female admitted on 1/13/2024  2:30 PM  with chief complaint of Somnolence.      History of Presenting Illness      Patient is a 83 y.o. female w. PMHx of: Hypothyroidism, asthma, hypertension, GERD, MGUS, chronic anemia, hyperlipidemia, history of COPD on BiPAP presenting to [facility] from home due to hypoxemia and somnolence with difficulty of breathing.  History was obtained from family at bedside family had noticed that patient had been hypoxemic and not tolerating her BiPAP as much at home which prompted them to come to the emergency department for further evaluation.  Patient is a rather poor historian and unable to provide me with any meaningful information due to her current mental state.  Given concern for hypoxic respiratory failure, CT was consulted however at this time patient's CODE STATUS has been changed to DNR CCA and DO NOT INTUBATE.  Per chart review, patient has advanced COPD and has been on noninvasive ventilation for about 4 years with hypoxemic respiratory failure on 3 L nasal cannula throughout the day and BiPAP at night.  Per family, they are having trouble oxygenating the patient for the last few days and noted her O2 saturation has been dropping to the 60s and has been getting much more fatigued and lethargic and confused which prompted her not to be eating or drinking as much.  While patient was in the emergency department, venous blood gas showed pCO2 of 123 with pH 7.33.  Chest x-ray shows consolidation consistent with pneumonia and patient tested positive for COVID-19 today.  There is also noted that patient tested positive for COVID-19 back in November 2023.  Currently patient is on BiPAP 15/5 with 40% FiO2 saturating in the 90s.  At this time patient will be admitted to stepdown unit and I was asked to do the admission H&P.     Review of Systems       Unable to be obtained from  patient     Family/Medical/Surgical/Social Hx      Family History   No family history on file.     Medical History        Past Medical History:   Diagnosis Date    Diaphragmatic hernia without obstruction or gangrene 10/27/2019     Hiatal hernia    Diverticulosis of intestine, part unspecified, without perforation or abscess without bleeding 08/16/2016     Diverticulosis    Personal history of diseases of the blood and blood-forming organs and certain disorders involving the immune mechanism       History of anemia    Personal history of other diseases of the circulatory system       History of hypertension    Personal history of other diseases of the digestive system       History of gastroesophageal reflux (GERD)    Personal history of other diseases of the nervous system and sense organs       History of hearing loss    Personal history of other diseases of the respiratory system       History of asthma    Personal history of other diseases of the respiratory system       History of chronic obstructive lung disease    Personal history of other diseases of the respiratory system       History of bronchitis    Personal history of other diseases of urinary system       H/O bladder problems    Personal history of other endocrine, nutritional and metabolic disease       History of hypothyroidism    Personal history of other endocrine, nutritional and metabolic disease       History of hyperlipidemia    Personal history of other endocrine, nutritional and metabolic disease       History of thyroid disorder    Personal history of other specified conditions       History of heartburn    Personal history of pneumonia (recurrent)       History of pneumonia    Shortness of breath       SOB (shortness of breath) on exertion    Unspecified urinary incontinence       Incontinence         Surgical History         Past Surgical History:   Procedure Laterality Date    OTHER SURGICAL HISTORY   06/28/2019     Tonsillectomy with  "adenoidectomy    TONSILLECTOMY   05/12/2016     Tonsillectomy         Social History               Socioeconomic History    Marital status:        Spouse name: Not on file    Number of children: Not on file    Years of education: Not on file    Highest education level: Not on file   Occupational History    Not on file   Tobacco Use    Smoking status: Not on file    Smokeless tobacco: Not on file   Substance and Sexual Activity    Alcohol use: Never    Drug use: Never    Sexual activity: Not on file   Other Topics Concern    Not on file   Social History Narrative    Not on file      Social Determinants of Health      Financial Resource Strain: Not on file   Food Insecurity: Not on file   Transportation Needs: Not on file   Physical Activity: Not on file   Stress: Not on file   Social Connections: Not on file   Intimate Partner Violence: Not on file   Housing Stability: Not on file            Medications/Allergies      @[facility]@  Bactrim [sulfamethoxazole-trimethoprim], Cefadroxil, Clarithromycin, Codeine, and Erythromycin     Physical Exam      /63 (BP Location: Left arm, Patient Position: Sitting)   Pulse 98   Temp 36.6 °C (97.9 °F) (Temporal)   Resp 16   Ht 1.575 m (5' 2\")   Wt (!) 43.1 kg (95 lb)   SpO2 97%   BMI 17.38 kg/m²   General: Elderly female appearing very somnolent  HEENT: PERRLA. EOMi.  Neck: No JVD. No LAD.  CV: Normal rate and rhythm. No murmurs or rubs auscultated.   Resp: Bilateral expiratory wheezing noted  Abdomen: Soft, nontender, nondistended abdomen. BSx4.   Extremities: No pedal edema b/l.        Data       CBC -        Lab Results   Component Value Date     WBC 4.4 01/13/2024     HGB 11.5 (L) 01/13/2024     HCT 39.8 01/13/2024      (H) 01/13/2024      01/13/2024         CMP -        Lab Results   Component Value Date     CALCIUM 8.3 (L) 01/13/2024     PROT 7.3 01/13/2024     ALBUMIN 3.6 01/13/2024     AST 16 01/13/2024     ALT 6 (L) 01/13/2024     ALKPHOS " "66 01/13/2024     BILITOT 0.5 01/13/2024         LIPID PANEL -   No results found for: \"CHOL\", \"TRIG\", \"HDL\", \"CHHDL\", \"LDLF\", \"VLDL\", \"NHDL\"     RENAL FUNCTION PANEL -           Lab Results   Component Value Date     GLUCOSE 103 (H) 01/13/2024      01/13/2024     K 3.9 01/13/2024     CL 83 (L) 01/13/2024     CO2 >45 (HH) 01/13/2024     ANIONGAP   01/13/2024         Comment:         One or more analytes used in this calculation is outside of the analytical measurement range. Calculation cannot be performed.     BUN 17 01/13/2024     CREATININE 0.76 01/13/2024     CALCIUM 8.3 (L) 01/13/2024     ALBUMIN 3.6 01/13/2024               Lab Results   Component Value Date     TROPHS 15 (H) 01/13/2024            Assessment and Plan      Patient is a 83 y.o. female w. PMHx of: Hypothyroidism, asthma, hypertension, GERD, MGUS, chronic anemia, hyperlipidemia, history of COPD on BiPAP [facility] to hypoxemia and somnolence with difficulty of breathing.     # COPD  #Acute on chronic hypoxic respiratory failure  #COVID-19 diagnosis  #Underlying pneumonia secondary to above  #Metabolic encephalopathy secondary to above        Plan  -Patient started on remdesivir, Decadron 6 mg every day  -Patient was started on meropenem and vancomycin in the emergency department.  Will continue with antibiotics at this time  -DuoNebs every 4 hours, mucolytic therapy with Mucinex  -Plan to continue patient on noninvasive ventilation overnight  -Strep pneumo, Legionella, mycoplasma, sputum cultures pending  -Encourage incentive spirometry use as tolerated  -Aggressive bronchopulmonary hygiene.  Respiratory consulted        # Hypothyroidism     Plan  -Continue Synthroid     # Hypertension  Plan  -Patient on Lasix 80 mg at home.  Will hold for now in the setting of soft blood pressures.           Chronic Medical Problems  # History of MGUS  # GERD  # Chronic anemia        # VTE PPx: Heparin  # GI PPx: Protonix  # Diet: " Regular diet  # Dispo: Stepdown  # Code Status: DNR, DNI     Dr. Rogers Ac  Internal Medicine PGY-2  January 13, 2024 7:30 PM  Patient fully evaluated and plan as above              Revision History    Patient fully evaluated on January 14.  2 to 3 L nasal cannula alert and awake.  Recheck labs in AM.  Continue aggressive pulmonary hygiene.              Ren Wong MD

## 2024-01-14 NOTE — CARE PLAN
The patient's goals for the shift include      The clinical goals for the shift include breathe better      Problem: Skin  Goal: Decreased wound size/increased tissue granulation at next dressing change  Outcome: Progressing  Flowsheets (Taken 1/14/2024 1038)  Decreased wound size/increased tissue granulation at next dressing change:   Promote sleep for wound healing   Protective dressings over bony prominences   Utilize specialty bed per algorithm  Goal: Participates in plan/prevention/treatment measures  Outcome: Progressing  Flowsheets (Taken 1/14/2024 1038)  Participates in plan/prevention/treatment measures: Elevate heels  Goal: Prevent/manage excess moisture  Outcome: Progressing  Flowsheets (Taken 1/14/2024 1038)  Prevent/manage excess moisture: Moisturize dry skin  Goal: Prevent/minimize sheer/friction injuries  Outcome: Progressing  Flowsheets (Taken 1/14/2024 1038)  Prevent/minimize sheer/friction injuries: HOB 30 degrees or less  Goal: Promote/optimize nutrition  Outcome: Progressing  Flowsheets (Taken 1/14/2024 1038)  Promote/optimize nutrition: Assist with feeding  Goal: Promote skin healing  Outcome: Progressing  Flowsheets (Taken 1/14/2024 1038)  Promote skin healing: Assess skin/pad under line(s)/device(s)

## 2024-01-14 NOTE — CONSULTS
Assessment and Plan  Patient is 83 y.o. female  with the following medical Problems:  COVID-19 pneumonia  Bilateral acquired pneumonia likely bacterial.  Acute on chronic hypoxic & hypercapnic respiratory failure requiring BiPAP  Severe pulmonary hypertension based on echocardiography dated February 28, 2019  Interstitial lung disease  COPD with acute exacerbation  Bronchiectasis    Plan of Care:  Continue with supplemental oxygen to keep sat 88-94  ABGs to follow-up on CO2 level  BiPAP while sleeping, napping, and as needed.  Continue with meropenem and vancomycin.  Vancomycin can be stopped if MRSA swab is negative  Continue with remdesivir for COVID-19 infection  DVT prophylaxis  Was of care were discussed with the family on admission.  Patient is DNR CCA      History of Present Illness:   History is mostly obtained from the chart as patient is unable to provide history.  Patient is a 83 y.o. female with Hypothyroidism, asthma, hypertension, GERD, MGUS, chronic anemia, hyperlipidemia, history of COPD on BiPAP presenting to [facility] from home due to hypoxemia and somnolence with difficulty of breathing.  History was obtained from family at bedside family had noticed that patient had been hypoxemic and not tolerating her BiPAP as much at home which prompted them to come to the emergency department for further evaluation.  Patient is a rather poor historian and unable to provide me with any meaningful information due to her current mental state.  Given concern for hypoxic respiratory failure, CT was consulted however at this time patient's CODE STATUS has been changed to DNR CCA and DO NOT INTUBATE.  Per chart review, patient has advanced COPD and has been on noninvasive ventilation for about 4 years with hypoxemic respiratory failure on 3 L nasal cannula throughout the day and BiPAP at night.  Per family, they are having trouble oxygenating the patient for the last few days and noted her O2 saturation has been  dropping to the 60s and has been getting much more fatigued and lethargic and confused which prompted her not to be eating or drinking as much.  While patient was in the emergency department, venous blood gas showed pCO2 of 123 with pH 7.33.  Chest x-ray shows consolidation consistent with pneumonia and patient tested positive for COVID-19 today.  There is also noted that patient tested positive for COVID-19 back in November 2023.  Currently patient is on BiPAP 15/5 with 40% FiO2 saturating in the 90s .    Past Medical/Surgical History:   Past Medical History:   Diagnosis Date    Diaphragmatic hernia without obstruction or gangrene 10/27/2019    Hiatal hernia    Diverticulosis of intestine, part unspecified, without perforation or abscess without bleeding 08/16/2016    Diverticulosis    Personal history of diseases of the blood and blood-forming organs and certain disorders involving the immune mechanism     History of anemia    Personal history of other diseases of the circulatory system     History of hypertension    Personal history of other diseases of the digestive system     History of gastroesophageal reflux (GERD)    Personal history of other diseases of the nervous system and sense organs     History of hearing loss    Personal history of other diseases of the respiratory system     History of asthma    Personal history of other diseases of the respiratory system     History of chronic obstructive lung disease    Personal history of other diseases of the respiratory system     History of bronchitis    Personal history of other diseases of urinary system     H/O bladder problems    Personal history of other endocrine, nutritional and metabolic disease     History of hypothyroidism    Personal history of other endocrine, nutritional and metabolic disease     History of hyperlipidemia    Personal history of other endocrine, nutritional and metabolic disease     History of thyroid disorder    Personal history  of other specified conditions     History of heartburn    Personal history of pneumonia (recurrent)     History of pneumonia    Shortness of breath     SOB (shortness of breath) on exertion    Unspecified urinary incontinence     Incontinence     Past Surgical History:   Procedure Laterality Date    OTHER SURGICAL HISTORY  06/28/2019    Tonsillectomy with adenoidectomy    TONSILLECTOMY  05/12/2016    Tonsillectomy       Family History:   No relevant family history has been documented for this patient.    Allergies:     Allergies   Allergen Reactions    Bactrim [Sulfamethoxazole-Trimethoprim] GI Upset    Cefadroxil GI Upset    Clarithromycin GI Upset    Codeine GI Upset    Erythromycin GI Upset         Social history:   reports that she does not drink alcohol and does not use drugs.    Current Medications:   No recently discontinued medications to reconcile    Review of Systems:   General: denies weight gain, denies loss of appetite, fever, chills, night sweats.  HEENT: denies headaches, dizziness, head trauma, visual changes, eye pain, tinnitus, nosebleeds, hoarseness or throat pain    Respiratory: denies chest pain, +ve dyspnea, cough but no hemoptysis  Cardiovascular: denies orthopnea, paroxysmal nocturnal dyspnea, leg swelling, and previous heart attack.    Gastrointestinal: denies pain, nausea vomiting, diarrhea, constipation, melena or bleeding.  Genitourinary: denies hematuria, frequency, urgency or dysuria  Neurology: denies syncope, seizures, paralysis, paraesthesia   Endocrine: denies polyuria, polydipsia, skin or hair changes, and heat or cold intolerance  Musculoskeletal: denies joint pain, swelling, arthritis or myalgia  Hematologic: denies bleeding, adenopathy and easy bruising  Skin: denies rashes and skin discoloration  Psychiatry: denies depression    Physical Exam:   Vital Signs:   Vitals:    01/14/24 1015   BP:    Pulse:    Resp:    Temp:    SpO2: 99%       Input/Output:    Intake/Output Summary  (Last 24 hours) at 1/14/2024 1154  Last data filed at 1/14/2024 1042  Gross per 24 hour   Intake 450 ml   Output --   Net 450 ml       Oxygen requirements:    Ventilator Information:  FiO2 (%):  [32 %-100 %] 32 %  S RR:  [8-18] 18  MAP (cm H2O):  [8] 8          General appearance: ill looking, lethargic, Not in pain or distress, in no respiratory distress    HEENT: Atraumatic/normocephalic, EOMI, LEXI, pharynx clear, moist mucosa, redness of the uvula appreciated,   Neck: Supple, no jugular venous distension, lymphadenopathy, thyromegaly or carotid bruits  Chest: Severely diminised breath sounds, no wheezing, no crackles and no tenderness over ribs   Cardiovascular: Normal S1, S2, regular rate and rhythm, no murmur, rub or gallop  Abdomen: Normal sounds present, soft, lax with no tenderness, no hepatosplenomegaly, and no masses  Extremities: No edema. Pulses are equally present.   Skin: intact, no rashes   Neurologic: Alert and oriented x 2-3, No focal deficit     Investigations:  Labs, radiological imaging and cardiac work up were personally reviewed          .       STAFF PHYSICIAN NOTE OF PERSONAL INVOLVEMENT IN CARE  As the attending physician, I certify that I personally reviewed the patient's history and personally examined the patient to confirm the physical findings described above, and that I reviewed the relevant imaging studies and available reports.  I also discussed the differential diagnosis and all of the proposed management plans with the patient and individuals accompanying the patient to this visit.  They had the opportunity to ask questions about the proposed management plans and to have those questions answered.

## 2024-01-15 LAB
ANION GAP BLDA CALCULATED.4IONS-SCNC: -17 MMO/L (ref 10–25)
BASE EXCESS BLDA CALC-SCNC: 36.6 MMOL/L (ref -2–3)
BODY TEMPERATURE: 37 DEGREES CELSIUS
CA-I BLDA-SCNC: 1.07 MMOL/L (ref 1.1–1.33)
CHLORIDE BLDA-SCNC: 92 MMOL/L (ref 98–107)
CRITICAL CALL TIME: 1220
CRITICAL CALLED BY: ABNORMAL
CRITICAL CALLED TO: ABNORMAL
CRITICAL READ BACK: ABNORMAL
GLUCOSE BLDA-MCNC: 108 MG/DL (ref 74–99)
HCO3 BLDA-SCNC: 69 MMOL/L (ref 22–26)
HCT VFR BLD EST: 32 % (ref 36–46)
HGB BLDA-MCNC: 10.7 G/DL (ref 12–16)
HOLD SPECIMEN: NORMAL
INHALED O2 CONCENTRATION: 32 %
LACTATE BLDA-SCNC: 0.5 MMOL/L (ref 0.4–2)
OXYHGB MFR BLDA: 96.8 % (ref 94–98)
PCO2 BLDA: 125 MM HG (ref 38–42)
PH BLDA: 7.35 PH (ref 7.38–7.42)
PO2 BLDA: 120 MM HG (ref 85–95)
POTASSIUM BLDA-SCNC: 3.7 MMOL/L (ref 3.5–5.3)
PROCALCITONIN SERPL-MCNC: 0.03 NG/ML
SAO2 % BLDA: 100 % (ref 94–100)
SODIUM BLDA-SCNC: 140 MMOL/L (ref 136–145)
VANCOMYCIN SERPL-MCNC: 13 UG/ML (ref 5–20)

## 2024-01-15 PROCEDURE — 36415 COLL VENOUS BLD VENIPUNCTURE: CPT

## 2024-01-15 PROCEDURE — 2500000004 HC RX 250 GENERAL PHARMACY W/ HCPCS (ALT 636 FOR OP/ED)

## 2024-01-15 PROCEDURE — 2500000004 HC RX 250 GENERAL PHARMACY W/ HCPCS (ALT 636 FOR OP/ED): Performed by: INTERNAL MEDICINE

## 2024-01-15 PROCEDURE — C9113 INJ PANTOPRAZOLE SODIUM, VIA: HCPCS

## 2024-01-15 PROCEDURE — 2500000005 HC RX 250 GENERAL PHARMACY W/O HCPCS: Performed by: NURSE PRACTITIONER

## 2024-01-15 PROCEDURE — 84132 ASSAY OF SERUM POTASSIUM: CPT | Performed by: INTERNAL MEDICINE

## 2024-01-15 PROCEDURE — 80202 ASSAY OF VANCOMYCIN: CPT

## 2024-01-15 PROCEDURE — 94660 CPAP INITIATION&MGMT: CPT

## 2024-01-15 PROCEDURE — 2500000002 HC RX 250 W HCPCS SELF ADMINISTERED DRUGS (ALT 637 FOR MEDICARE OP, ALT 636 FOR OP/ED): Performed by: STUDENT IN AN ORGANIZED HEALTH CARE EDUCATION/TRAINING PROGRAM

## 2024-01-15 PROCEDURE — 94640 AIRWAY INHALATION TREATMENT: CPT

## 2024-01-15 PROCEDURE — 2060000001 HC INTERMEDIATE ICU ROOM DAILY

## 2024-01-15 PROCEDURE — 36600 WITHDRAWAL OF ARTERIAL BLOOD: CPT

## 2024-01-15 RX ORDER — DIGOXIN 0.25 MG/ML
250 INJECTION INTRAMUSCULAR; INTRAVENOUS DAILY
Status: DISCONTINUED | OUTPATIENT
Start: 2024-01-15 | End: 2024-01-16

## 2024-01-15 RX ORDER — METOPROLOL TARTRATE 1 MG/ML
5 INJECTION, SOLUTION INTRAVENOUS ONCE
Status: COMPLETED | OUTPATIENT
Start: 2024-01-15 | End: 2024-01-15

## 2024-01-15 RX ORDER — DIGOXIN 0.25 MG/ML
INJECTION INTRAMUSCULAR; INTRAVENOUS
Status: COMPLETED
Start: 2024-01-15 | End: 2024-01-15

## 2024-01-15 RX ORDER — AMMONIUM LACTATE 12 G/100G
LOTION TOPICAL EVERY 12 HOURS PRN
Status: DISCONTINUED | OUTPATIENT
Start: 2024-01-15 | End: 2024-01-21 | Stop reason: HOSPADM

## 2024-01-15 RX ORDER — DILTIAZEM HCL/D5W 125 MG/125
5-15 PLASTIC BAG, INJECTION (ML) INTRAVENOUS CONTINUOUS
Status: DISCONTINUED | OUTPATIENT
Start: 2024-01-15 | End: 2024-01-16

## 2024-01-15 RX ORDER — DEXTROSE MONOHYDRATE AND SODIUM CHLORIDE 5; .9 G/100ML; G/100ML
60 INJECTION, SOLUTION INTRAVENOUS CONTINUOUS
Status: DISCONTINUED | OUTPATIENT
Start: 2024-01-15 | End: 2024-01-21 | Stop reason: HOSPADM

## 2024-01-15 RX ORDER — DIGOXIN 0.25 MG/ML
250 INJECTION INTRAMUSCULAR; INTRAVENOUS ONCE
Status: COMPLETED | OUTPATIENT
Start: 2024-01-15 | End: 2024-01-15

## 2024-01-15 RX ADMIN — HEPARIN SODIUM 5000 UNITS: 5000 INJECTION INTRAVENOUS; SUBCUTANEOUS at 22:51

## 2024-01-15 RX ADMIN — DEXAMETHASONE SODIUM PHOSPHATE 6 MG: 10 INJECTION INTRAMUSCULAR; INTRAVENOUS at 00:41

## 2024-01-15 RX ADMIN — IPRATROPIUM BROMIDE AND ALBUTEROL SULFATE 3 ML: .5; 3 SOLUTION RESPIRATORY (INHALATION) at 18:42

## 2024-01-15 RX ADMIN — IPRATROPIUM BROMIDE AND ALBUTEROL SULFATE 3 ML: .5; 3 SOLUTION RESPIRATORY (INHALATION) at 07:12

## 2024-01-15 RX ADMIN — DEXTROSE AND SODIUM CHLORIDE 100 ML/HR: 5; 900 INJECTION, SOLUTION INTRAVENOUS at 22:37

## 2024-01-15 RX ADMIN — HEPARIN SODIUM 5000 UNITS: 5000 INJECTION INTRAVENOUS; SUBCUTANEOUS at 03:07

## 2024-01-15 RX ADMIN — IPRATROPIUM BROMIDE AND ALBUTEROL SULFATE 3 ML: .5; 3 SOLUTION RESPIRATORY (INHALATION) at 03:27

## 2024-01-15 RX ADMIN — METOROPROLOL TARTRATE 5 MG: 5 INJECTION, SOLUTION INTRAVENOUS at 16:45

## 2024-01-15 RX ADMIN — IPRATROPIUM BROMIDE AND ALBUTEROL SULFATE 3 ML: .5; 3 SOLUTION RESPIRATORY (INHALATION) at 15:24

## 2024-01-15 RX ADMIN — DEXTROSE AND SODIUM CHLORIDE 100 ML/HR: 5; 900 INJECTION, SOLUTION INTRAVENOUS at 17:22

## 2024-01-15 RX ADMIN — DIGOXIN 250 MCG: 250 INJECTION, SOLUTION INTRAMUSCULAR; INTRAVENOUS; PARENTERAL at 18:55

## 2024-01-15 RX ADMIN — IPRATROPIUM BROMIDE AND ALBUTEROL SULFATE 3 ML: .5; 3 SOLUTION RESPIRATORY (INHALATION) at 11:03

## 2024-01-15 RX ADMIN — DEXAMETHASONE SODIUM PHOSPHATE 6 MG: 10 INJECTION INTRAMUSCULAR; INTRAVENOUS at 11:43

## 2024-01-15 RX ADMIN — MEROPENEM 1 G: 1 INJECTION, POWDER, FOR SOLUTION INTRAVENOUS at 06:01

## 2024-01-15 RX ADMIN — PANTOPRAZOLE SODIUM 40 MG: 40 INJECTION, POWDER, FOR SOLUTION INTRAVENOUS at 06:01

## 2024-01-15 RX ADMIN — MEROPENEM 1 G: 1 INJECTION, POWDER, FOR SOLUTION INTRAVENOUS at 22:35

## 2024-01-15 RX ADMIN — DIGOXIN 250 MCG: 250 INJECTION, SOLUTION INTRAMUSCULAR; INTRAVENOUS; PARENTERAL at 17:17

## 2024-01-15 RX ADMIN — IPRATROPIUM BROMIDE AND ALBUTEROL SULFATE 3 ML: .5; 3 SOLUTION RESPIRATORY (INHALATION) at 22:56

## 2024-01-15 RX ADMIN — Medication 100 MG: at 23:19

## 2024-01-15 RX ADMIN — IPRATROPIUM BROMIDE AND ALBUTEROL SULFATE 3 ML: .5; 3 SOLUTION RESPIRATORY (INHALATION) at 00:00

## 2024-01-15 RX ADMIN — DIGOXIN 250 MCG: 0.25 INJECTION INTRAMUSCULAR; INTRAVENOUS at 17:17

## 2024-01-15 RX ADMIN — HEPARIN SODIUM 5000 UNITS: 5000 INJECTION INTRAVENOUS; SUBCUTANEOUS at 11:43

## 2024-01-15 ASSESSMENT — PAIN SCALES - GENERAL
PAINLEVEL_OUTOF10: 0 - NO PAIN
PAINLEVEL_OUTOF10: 0 - NO PAIN

## 2024-01-15 NOTE — CONSULTS
Assessment and Plan  Patient is 83 y.o. female  with the following medical Problems:  COVID-19 pneumonia  Bilateral acquired pneumonia likely bacterial.  Acute on chronic hypoxic & hypercapnic respiratory failure requiring BiPAP  Severe pulmonary hypertension based on echocardiography dated February 28, 2019  Interstitial lung disease  COPD with acute exacerbation  Bronchiectasis    Plan of Care:  Continue with supplemental oxygen to keep sat 88-94  ABGs to follow-up on CO2 level  BiPAP while sleeping, napping, and as needed.  Continue with meropenem. Vancomycin was stopped as MRSA swab was negative  Continue with remdesivir for COVID-19 infection  DVT prophylaxis  Was of care were discussed with the family on admission.  Patient is DNR CCA      History of Present Illness:   History is mostly obtained from the chart as patient is unable to provide history.  Patient is a 83 y.o. female with Hypothyroidism, asthma, hypertension, GERD, MGUS, chronic anemia, hyperlipidemia, history of COPD on BiPAP presenting to [facility] from home due to hypoxemia and somnolence with difficulty of breathing.  History was obtained from family at bedside family had noticed that patient had been hypoxemic and not tolerating her BiPAP as much at home which prompted them to come to the emergency department for further evaluation.  Patient is a rather poor historian and unable to provide me with any meaningful information due to her current mental state.  Given concern for hypoxic respiratory failure, CT was consulted however at this time patient's CODE STATUS has been changed to DNR CCA and DO NOT INTUBATE.  Per chart review, patient has advanced COPD and has been on noninvasive ventilation for about 4 years with hypoxemic respiratory failure on 3 L nasal cannula throughout the day and BiPAP at night.  Per family, they are having trouble oxygenating the patient for the last few days and noted her O2 saturation has been dropping to the 60s and  has been getting much more fatigued and lethargic and confused which prompted her not to be eating or drinking as much.  While patient was in the emergency department, venous blood gas showed pCO2 of 123 with pH 7.33.  Chest x-ray shows consolidation consistent with pneumonia and patient tested positive for COVID-19 today.  There is also noted that patient tested positive for COVID-19 back in November 2023.  Currently patient is on BiPAP 15/5 with 40% FiO2 saturating in the 90s .    Daily progress:  January 15, 2024: Patient is a chronic retainer with a CO2 in the 100s.  She was placed on BiPAP 4 hours on, 4 hours off overnight.  She is awake and interactive today however repeated blood gases showed a pH of 7.35 and pCO2 of 125.  She remains on supplemental oxygen.  Continues to be on remdesivir, Decadron, and meropenem.  Vancomycin was stopped as MRSA swab was negative.    Past Medical/Surgical History:   Past Medical History:   Diagnosis Date    Diaphragmatic hernia without obstruction or gangrene 10/27/2019    Hiatal hernia    Diverticulosis of intestine, part unspecified, without perforation or abscess without bleeding 08/16/2016    Diverticulosis    Personal history of diseases of the blood and blood-forming organs and certain disorders involving the immune mechanism     History of anemia    Personal history of other diseases of the circulatory system     History of hypertension    Personal history of other diseases of the digestive system     History of gastroesophageal reflux (GERD)    Personal history of other diseases of the nervous system and sense organs     History of hearing loss    Personal history of other diseases of the respiratory system     History of asthma    Personal history of other diseases of the respiratory system     History of chronic obstructive lung disease    Personal history of other diseases of the respiratory system     History of bronchitis    Personal history of other diseases of  urinary system     H/O bladder problems    Personal history of other endocrine, nutritional and metabolic disease     History of hypothyroidism    Personal history of other endocrine, nutritional and metabolic disease     History of hyperlipidemia    Personal history of other endocrine, nutritional and metabolic disease     History of thyroid disorder    Personal history of other specified conditions     History of heartburn    Personal history of pneumonia (recurrent)     History of pneumonia    Shortness of breath     SOB (shortness of breath) on exertion    Unspecified urinary incontinence     Incontinence     Past Surgical History:   Procedure Laterality Date    OTHER SURGICAL HISTORY  06/28/2019    Tonsillectomy with adenoidectomy    TONSILLECTOMY  05/12/2016    Tonsillectomy       Family History:   No relevant family history has been documented for this patient.    Allergies:     Allergies   Allergen Reactions    Bactrim [Sulfamethoxazole-Trimethoprim] GI Upset    Cefadroxil GI Upset    Clarithromycin GI Upset    Codeine GI Upset    Erythromycin GI Upset         Social history:   reports that she does not drink alcohol and does not use drugs.    Current Medications:   No recently discontinued medications to reconcile    Review of Systems:   General: denies weight gain, denies loss of appetite, fever, chills, night sweats.  HEENT: denies headaches, dizziness, head trauma, visual changes, eye pain, tinnitus, nosebleeds, hoarseness or throat pain    Respiratory: denies chest pain, +ve dyspnea, cough but no hemoptysis  Cardiovascular: denies orthopnea, paroxysmal nocturnal dyspnea, leg swelling, and previous heart attack.    Gastrointestinal: denies pain, nausea vomiting, diarrhea, constipation, melena or bleeding.  Genitourinary: denies hematuria, frequency, urgency or dysuria  Neurology: denies syncope, seizures, paralysis, paraesthesia   Endocrine: denies polyuria, polydipsia, skin or hair changes, and heat or  cold intolerance  Musculoskeletal: denies joint pain, swelling, arthritis or myalgia  Hematologic: denies bleeding, adenopathy and easy bruising  Skin: denies rashes and skin discoloration  Psychiatry: denies depression    Physical Exam:   Vital Signs:   Vitals:    01/15/24 1155   BP: 135/63   Pulse: 80   Resp: 20   Temp: 36.3 °C (97.3 °F)   SpO2: 92%       Input/Output:    Intake/Output Summary (Last 24 hours) at 1/15/2024 1321  Last data filed at 1/15/2024 0500  Gross per 24 hour   Intake --   Output 500 ml   Net -500 ml         Oxygen requirements:    Ventilator Information:  FiO2 (%):  [28 %-50 %] 50 %  S RR:  [18] 18  MAP (cm H2O):  [8-8.4] 8          General appearance: ill looking, lethargic, Not in pain or distress, in no respiratory distress    HEENT: Atraumatic/normocephalic, EOMI, LEXI, pharynx clear, moist mucosa, redness of the uvula appreciated,   Neck: Supple, no jugular venous distension, lymphadenopathy, thyromegaly or carotid bruits  Chest: Severely diminised breath sounds, no wheezing, no crackles and no tenderness over ribs   Cardiovascular: Normal S1, S2, regular rate and rhythm, no murmur, rub or gallop  Abdomen: Normal sounds present, soft, lax with no tenderness, no hepatosplenomegaly, and no masses  Extremities: No edema. Pulses are equally present.   Skin: intact, no rashes   Neurologic: Alert and oriented x 2-3, No focal deficit     Investigations:  Labs, radiological imaging and cardiac work up were personally reviewed          .       STAFF PHYSICIAN NOTE OF PERSONAL INVOLVEMENT IN CARE  As the attending physician, I certify that I personally reviewed the patient's history and personally examined the patient to confirm the physical findings described above, and that I reviewed the relevant imaging studies and available reports.  I also discussed the differential diagnosis and all of the proposed management plans with the patient and individuals accompanying the patient to this visit.   They had the opportunity to ask questions about the proposed management plans and to have those questions answered.

## 2024-01-15 NOTE — PROGRESS NOTES
Fidelina Milton is a 83 y.o. female on day 2 of admission presenting with Shortness of breath.      Subjective   Patient is a 83 year old female on day 2 of admission presenting with shortness of breath. Patient has a history of hypothyroidism, asthma, hypertension, GERD, MGUS, chronic anemia, hyperlipidemia, COPD on BIPAP.       Objective     Last Recorded Vitals  /63 (BP Location: Right arm, Patient Position: Lying)   Pulse 80   Temp 36.3 °C (97.3 °F) (Temporal)   Resp 20   Wt (!) 43.1 kg (95 lb)   SpO2 92%   Intake/Output last 3 Shifts:    Intake/Output Summary (Last 24 hours) at 1/15/2024 1304  Last data filed at 1/15/2024 0500  Gross per 24 hour   Intake --   Output 500 ml   Net -500 ml       Admission Weight  Weight: (!) 43.1 kg (95 lb) (01/13/24 1431)    Daily Weight  01/13/24 : (!) 43.1 kg (95 lb)    Image Results  XR chest 1 view  Narrative: Interpreted By:  Janak Roche,   STUDY:  XR CHEST 1 VIEW;  1/13/2024 3:36 pm      INDICATION:  Signs/Symptoms:sob.      COMPARISON:  05/29/2020      ACCESSION NUMBER(S):  [id number]      ORDERING CLINICIAN:  HOMA HARDING      FINDINGS:  Please see the impression      Impression: 1.  Extensive chronic interstitial opacities in the bilateral lungs.  The possibility of superimposed airspace disease particular in the  upper lobes can not be excluded.  2. COPD.  3. Persistent blunting of the left costophrenic angle.  4. Cardiac silhouette.  5. No pneumothorax.  6. Multilevel thoracic spondylosis.  7. Diffuse osteopenia.              MACRO:  None      Signed by: Janak Roche 1/13/2024 4:17 PM  Dictation workstation:   [id number]      Physical Exam  General: Patient was A&O x3 and able to follow commands  Head: no scars, bruises or trauma to the examined area  Cardiac: regular rate and rhythm. No murmurs, rub or gallops appreciated  Pulmonology: lungs clear to auscultation. No wheezes, rales or rhonchi appreciated   Abdominal: normal bowel sounds, soft and non  tender     Relevant Results      Scheduled medications  dexAMETHasone, 6 mg, intravenous, q12h  [Held by provider] furosemide, 80 mg, oral, Daily  heparin (porcine), 5,000 Units, subcutaneous, q8h  ipratropium-albuteroL, 3 mL, nebulization, q4h  levothyroxine, 50 mcg, oral, Daily  meropenem, 1 g, intravenous, q12h  pantoprazole, 40 mg, oral, Daily before breakfast   Or  pantoprazole, 40 mg, intravenous, Daily before breakfast  remdesivir, 100 mg, intravenous, q24h      Continuous medications     PRN medications  PRN medications: acetaminophen **OR** acetaminophen **OR** acetaminophen, ammonium lactate, guaiFENesin, ipratropium-albuteroL, oxygen, oxygen, polyethylene glycol  Results for orders placed or performed during the hospital encounter of 01/13/24 (from the past 24 hour(s))   Vancomycin   Result Value Ref Range    Vancomycin 13.0 5.0 - 20.0 ug/mL   Lavender Top   Result Value Ref Range    Extra Tube Hold for add-ons.    Blood Gas Arterial Full Panel   Result Value Ref Range    POCT pH, Arterial 7.35 (L) 7.38 - 7.42 pH    POCT pCO2, Arterial 125 (HH) 38 - 42 mm Hg    POCT pO2, Arterial 120 (H) 85 - 95 mm Hg    POCT SO2, Arterial 100 94 - 100 %    POCT Oxy Hemoglobin, Arterial 96.8 94.0 - 98.0 %    POCT Hematocrit Calculated, Arterial 32.0 (L) 36.0 - 46.0 %    POCT Sodium, Arterial 140 136 - 145 mmol/L    POCT Potassium, Arterial 3.7 3.5 - 5.3 mmol/L    POCT Chloride, Arterial 92 (L) 98 - 107 mmol/L    POCT Ionized Calcium, Arterial 1.07 (L) 1.10 - 1.33 mmol/L    POCT Glucose, Arterial 108 (H) 74 - 99 mg/dL    POCT Lactate, Arterial 0.5 0.4 - 2.0 mmol/L    POCT Base Excess, Arterial 36.6 (H) -2.0 - 3.0 mmol/L    POCT HCO3 Calculated, Arterial 69.0 (H) 22.0 - 26.0 mmol/L    POCT Hemoglobin, Arterial 10.7 (L) 12.0 - 16.0 g/dL    POCT Anion Gap, Arterial -17 (L) 10 - 25 mmo/L    Patient Temperature 37.0 degrees Celsius    FiO2 32 %    Critical Called By Tamy Marr RRT     Critical Called To       Critical Call Time [phone]     Critical Read Back Y               Assessment/Plan   This patient currently has cardiac telemetry ordered; if you would like to modify or discontinue the telemetry order, click here to go to the orders activity to modify/discontinue the order.          Principal Problem:    Shortness of breath    Patient was fully examined on January 15, 2024. CPAP removed and patient was placed on nasal cannula. Recheck labs in AM.      Patient to have pulmonary consultation today.         Malika Otero

## 2024-01-15 NOTE — PROGRESS NOTES
This TCC met with patient's spouse at bedside, introduced self and explained role.  Patient unable to participate in assessment due to medical condition.  Demographic information and insurance verified.  Patient is from home with spouse and family.  Patient requires assistance with all ADLs.  Sleeps in recliner in Family Room, ambulating very short distances with walker and 1-person assist.  Spouse states patient is on home O2, receives nebulizer treatments at home every 4 hours and has a CPAP at HS.  Denies SW needs at this time.  Discharge disposition pending hospital course and PT/OT evals.  Anticipate patient will need SNF and transportation arranged at discharge.  TCC will continue to follow care progression for discharge planning needs.     01/15/24 1601   Discharge Planning   Living Arrangements Spouse/significant other   Support Systems Spouse/significant other;Children;Family members   Assistance Needed Assistance with all ADLs. Sleeps in recliner in Family Room, ambulating short distances with walker PTA   Type of Residence Private residence   Home or Post Acute Services Post acute facilities (Rehab/SNF/etc)   Type of Post Acute Facility Services Rehab   Does the patient need discharge transport arranged? Yes   RoundTrip coordination needed? Yes   Has discharge transport been arranged? No     Julia Braun RN BSN, ED-TCC

## 2024-01-15 NOTE — CARE PLAN
Problem: Skin  Goal: Decreased wound size/increased tissue granulation at next dressing change  Outcome: Progressing  Flowsheets (Taken 1/15/2024 1115)  Decreased wound size/increased tissue granulation at next dressing change:   Utilize specialty bed per algorithm   Protective dressings over bony prominences  Goal: Participates in plan/prevention/treatment measures  Outcome: Progressing  Flowsheets (Taken 1/15/2024 1115)  Participates in plan/prevention/treatment measures: Elevate heels  Goal: Prevent/manage excess moisture  Outcome: Progressing  Flowsheets (Taken 1/15/2024 1115)  Prevent/manage excess moisture: Moisturize dry skin  Goal: Prevent/minimize sheer/friction injuries  Outcome: Progressing  Flowsheets (Taken 1/15/2024 1115)  Prevent/minimize sheer/friction injuries:   Turn/reposition every 2 hours/use positioning/transfer devices   HOB 30 degrees or less  Goal: Promote/optimize nutrition  Outcome: Progressing  Flowsheets (Taken 1/15/2024 1115)  Promote/optimize nutrition: Assist with feeding  Goal: Promote skin healing  Outcome: Progressing  Flowsheets (Taken 1/15/2024 1115)  Promote skin healing:   Turn/reposition every 2 hours/use positioning/transfer devices   Protective dressings over bony prominences

## 2024-01-15 NOTE — PROGRESS NOTES
Physical Therapy                 Therapy Communication Note    Patient Name: Fidelina Milton  MRN: [id number]  Today's Date: 1/15/2024     Discipline: Physical Therapy    Missed Visit Reason:  (Attempted to see pt. at 9:55 am, but on hold in am per RN-on bipap. Attempted to see pt. again in pm at 15:28 pm, but pt.'s spouse refused for pt. stating that she was too fatigued at this time. Prior functional status obtained from spouse. Will-reattempt eval tomorrow.)    Missed Time: Attempt

## 2024-01-15 NOTE — PROGRESS NOTES
Occupational Therapy                 Therapy Communication Note    Patient Name: Fidelina Milton  MRN: [id number]  Today's Date: 1/15/2024     Discipline: Occupational Therapy    Missed Visit Reason: Attempted to see pt. at 9:55 am, but on hold in am per RN-on bipap. Attempted to see pt. again in pm at 15:27 pm, but pt.'s spouse refused for pt. stating that she was too fatigued at this time. Prior functional status obtained from spouse.

## 2024-01-15 NOTE — CONSULTS
Vancomycin Dosing by Pharmacy- Cessation of Therapy    Consult to pharmacy for vancomycin dosing has been discontinued by the prescriber, pharmacy will sign off at this time.    Please call pharmacy if there are further questions or re-enter a consult if vancomycin is resumed.     Maribel Wall, JunD

## 2024-01-16 ENCOUNTER — APPOINTMENT (OUTPATIENT)
Dept: RADIOLOGY | Facility: HOSPITAL | Age: 84
DRG: 871 | End: 2024-01-16
Payer: MEDICARE

## 2024-01-16 LAB
ALBUMIN SERPL BCP-MCNC: 3.1 G/DL (ref 3.4–5)
ALP SERPL-CCNC: 54 U/L (ref 33–136)
ALT SERPL W P-5'-P-CCNC: 7 U/L (ref 7–45)
ANION GAP BLDA CALCULATED.4IONS-SCNC: -12 MMO/L (ref 10–25)
ANION GAP SERPL CALC-SCNC: ABNORMAL MMOL/L
APPARATUS: ABNORMAL
ARTERIAL PATENCY WRIST A: POSITIVE
AST SERPL W P-5'-P-CCNC: 17 U/L (ref 9–39)
ATRIAL RATE: 147 BPM
BASE EXCESS BLDA CALC-SCNC: 33.8 MMOL/L (ref -2–3)
BILIRUB SERPL-MCNC: 0.3 MG/DL (ref 0–1.2)
BODY TEMPERATURE: ABNORMAL
BUN SERPL-MCNC: 19 MG/DL (ref 6–23)
CA-I BLDA-SCNC: 1.1 MMOL/L (ref 1.1–1.33)
CALCIUM SERPL-MCNC: 7.7 MG/DL (ref 8.6–10.3)
CHLORIDE BLDA-SCNC: 90 MMOL/L (ref 98–107)
CHLORIDE SERPL-SCNC: 92 MMOL/L (ref 98–107)
CO2 SERPL-SCNC: >45 MMOL/L (ref 21–32)
CREAT SERPL-MCNC: 0.6 MG/DL (ref 0.5–1.05)
CRITICAL CALL TIME: 1225
CRITICAL CALLED BY: ABNORMAL
CRITICAL CALLED TO: ABNORMAL
EGFRCR SERPLBLD CKD-EPI 2021: 88 ML/MIN/1.73M*2
GLUCOSE BLD MANUAL STRIP-MCNC: 86 MG/DL (ref 74–99)
GLUCOSE BLDA-MCNC: 87 MG/DL (ref 74–99)
GLUCOSE SERPL-MCNC: 110 MG/DL (ref 74–99)
HCO3 BLDA-SCNC: 65.1 MMOL/L (ref 22–26)
HCT VFR BLD EST: 29 % (ref 36–46)
HGB BLDA-MCNC: 9.8 G/DL (ref 12–16)
INHALED O2 CONCENTRATION: 32 %
LACTATE BLDA-SCNC: 0.5 MMOL/L (ref 0.4–2)
OXYHGB MFR BLDA: 96.3 % (ref 94–98)
PCO2 BLDA: 118 MM HG (ref 38–42)
PH BLDA: 7.35 PH (ref 7.38–7.42)
PO2 BLDA: 102 MM HG (ref 85–95)
POTASSIUM BLDA-SCNC: 3.7 MMOL/L (ref 3.5–5.3)
POTASSIUM SERPL-SCNC: 3.7 MMOL/L (ref 3.5–5.3)
PROT SERPL-MCNC: 6 G/DL (ref 6.4–8.2)
Q ONSET: 225 MS
QRS COUNT: 24 BEATS
QRS DURATION: 68 MS
QT INTERVAL: 352 MS
QTC CALCULATION(BAZETT): 543 MS
QTC FREDERICIA: 470 MS
R AXIS: 98 DEGREES
SAO2 % BLDA: 99 % (ref 94–100)
SODIUM BLDA-SCNC: 139 MMOL/L (ref 136–145)
SODIUM SERPL-SCNC: 147 MMOL/L (ref 136–145)
SPECIMEN DRAWN FROM PATIENT: ABNORMAL
T AXIS: 140 DEGREES
T OFFSET: 401 MS
VENTRICULAR RATE: 143 BPM

## 2024-01-16 PROCEDURE — 71045 X-RAY EXAM CHEST 1 VIEW: CPT

## 2024-01-16 PROCEDURE — 2500000004 HC RX 250 GENERAL PHARMACY W/ HCPCS (ALT 636 FOR OP/ED): Performed by: INTERNAL MEDICINE

## 2024-01-16 PROCEDURE — 2500000001 HC RX 250 WO HCPCS SELF ADMINISTERED DRUGS (ALT 637 FOR MEDICARE OP): Performed by: INTERNAL MEDICINE

## 2024-01-16 PROCEDURE — 2060000001 HC INTERMEDIATE ICU ROOM DAILY

## 2024-01-16 PROCEDURE — 2500000004 HC RX 250 GENERAL PHARMACY W/ HCPCS (ALT 636 FOR OP/ED)

## 2024-01-16 PROCEDURE — 2500000002 HC RX 250 W HCPCS SELF ADMINISTERED DRUGS (ALT 637 FOR MEDICARE OP, ALT 636 FOR OP/ED): Performed by: STUDENT IN AN ORGANIZED HEALTH CARE EDUCATION/TRAINING PROGRAM

## 2024-01-16 PROCEDURE — 94640 AIRWAY INHALATION TREATMENT: CPT

## 2024-01-16 PROCEDURE — 80053 COMPREHEN METABOLIC PANEL: CPT | Performed by: INTERNAL MEDICINE

## 2024-01-16 PROCEDURE — 94660 CPAP INITIATION&MGMT: CPT

## 2024-01-16 PROCEDURE — 82947 ASSAY GLUCOSE BLOOD QUANT: CPT

## 2024-01-16 PROCEDURE — 36415 COLL VENOUS BLD VENIPUNCTURE: CPT | Performed by: INTERNAL MEDICINE

## 2024-01-16 PROCEDURE — 71045 X-RAY EXAM CHEST 1 VIEW: CPT | Performed by: RADIOLOGY

## 2024-01-16 RX ORDER — METOPROLOL TARTRATE 25 MG/1
25 TABLET, FILM COATED ORAL 2 TIMES DAILY
Status: DISCONTINUED | OUTPATIENT
Start: 2024-01-16 | End: 2024-01-21 | Stop reason: HOSPADM

## 2024-01-16 RX ADMIN — MEROPENEM 1 G: 1 INJECTION, POWDER, FOR SOLUTION INTRAVENOUS at 23:04

## 2024-01-16 RX ADMIN — PANTOPRAZOLE SODIUM 40 MG: 40 TABLET, DELAYED RELEASE ORAL at 06:18

## 2024-01-16 RX ADMIN — METOPROLOL TARTRATE 25 MG: 25 TABLET, FILM COATED ORAL at 21:00

## 2024-01-16 RX ADMIN — LEVOTHYROXINE SODIUM 50 MCG: 50 TABLET ORAL at 06:18

## 2024-01-16 RX ADMIN — APIXABAN 2.5 MG: 2.5 TABLET, FILM COATED ORAL at 13:09

## 2024-01-16 RX ADMIN — DEXAMETHASONE SODIUM PHOSPHATE 6 MG: 10 INJECTION INTRAMUSCULAR; INTRAVENOUS at 23:04

## 2024-01-16 RX ADMIN — IPRATROPIUM BROMIDE AND ALBUTEROL SULFATE 3 ML: .5; 3 SOLUTION RESPIRATORY (INHALATION) at 02:19

## 2024-01-16 RX ADMIN — IPRATROPIUM BROMIDE AND ALBUTEROL SULFATE 3 ML: .5; 3 SOLUTION RESPIRATORY (INHALATION) at 19:38

## 2024-01-16 RX ADMIN — APIXABAN 2.5 MG: 2.5 TABLET, FILM COATED ORAL at 21:00

## 2024-01-16 RX ADMIN — METOPROLOL TARTRATE 25 MG: 25 TABLET, FILM COATED ORAL at 13:09

## 2024-01-16 RX ADMIN — MEROPENEM 1 G: 1 INJECTION, POWDER, FOR SOLUTION INTRAVENOUS at 09:42

## 2024-01-16 RX ADMIN — IPRATROPIUM BROMIDE AND ALBUTEROL SULFATE 3 ML: .5; 3 SOLUTION RESPIRATORY (INHALATION) at 23:00

## 2024-01-16 RX ADMIN — DEXTROSE AND SODIUM CHLORIDE 60 ML/HR: 5; 900 INJECTION, SOLUTION INTRAVENOUS at 14:52

## 2024-01-16 RX ADMIN — DEXAMETHASONE SODIUM PHOSPHATE 6 MG: 10 INJECTION INTRAMUSCULAR; INTRAVENOUS at 13:09

## 2024-01-16 RX ADMIN — IPRATROPIUM BROMIDE AND ALBUTEROL SULFATE 3 ML: .5; 3 SOLUTION RESPIRATORY (INHALATION) at 10:44

## 2024-01-16 RX ADMIN — IPRATROPIUM BROMIDE AND ALBUTEROL SULFATE 3 ML: .5; 3 SOLUTION RESPIRATORY (INHALATION) at 15:06

## 2024-01-16 RX ADMIN — IPRATROPIUM BROMIDE AND ALBUTEROL SULFATE 3 ML: .5; 3 SOLUTION RESPIRATORY (INHALATION) at 06:39

## 2024-01-16 RX ADMIN — DIGOXIN 250 MCG: 250 INJECTION, SOLUTION INTRAMUSCULAR; INTRAVENOUS; PARENTERAL at 09:41

## 2024-01-16 ASSESSMENT — COGNITIVE AND FUNCTIONAL STATUS - GENERAL
MOVING FROM LYING ON BACK TO SITTING ON SIDE OF FLAT BED WITH BEDRAILS: A LOT
CLIMB 3 TO 5 STEPS WITH RAILING: TOTAL
TOILETING: TOTAL
DRESSING REGULAR LOWER BODY CLOTHING: TOTAL
PERSONAL GROOMING: TOTAL
HELP NEEDED FOR BATHING: TOTAL
TURNING FROM BACK TO SIDE WHILE IN FLAT BAD: TOTAL
STANDING UP FROM CHAIR USING ARMS: TOTAL
DAILY ACTIVITIY SCORE: 7
EATING MEALS: A LOT
MOBILITY SCORE: 7
DRESSING REGULAR UPPER BODY CLOTHING: TOTAL
WALKING IN HOSPITAL ROOM: TOTAL
MOVING TO AND FROM BED TO CHAIR: TOTAL

## 2024-01-16 ASSESSMENT — PAIN SCALES - GENERAL
PAINLEVEL_OUTOF10: 0 - NO PAIN
PAINLEVEL_OUTOF10: 0 - NO PAIN

## 2024-01-16 NOTE — CONSULTS
Assessment and Plan  Patient is 83 y.o. female  with the following medical Problems:  COVID-19 pneumonia  Bilateral acquired pneumonia likely bacterial.  Acute on chronic hypoxic & hypercapnic respiratory failure requiring BiPAP  Severe pulmonary hypertension based on echocardiography dated February 28, 2019  Interstitial lung disease  COPD with acute exacerbation  Bronchiectasis    Plan of Care:  Continue with supplemental oxygen to keep sat 88-94  BiPAP while sleeping, napping, and as needed.  Continue with meropenem. Vancomycin was stopped as MRSA swab was negative  Continue with remdesivir and Decadron for COVID-19 infection  DVT prophylaxis.  Patient is currently on Eliquis 2.5 mg twice daily for atrial fibrillation which covers for DVT prophylaxis.  Was of care were discussed with the family on admission.  Patient is DNR CCA  Repeat chest x-ray today.  Speech therapy evaluation      History of Present Illness:   History is mostly obtained from the chart as patient is unable to provide history.  Patient is a 83 y.o. female with Hypothyroidism, asthma, hypertension, GERD, MGUS, chronic anemia, hyperlipidemia, history of COPD on BiPAP presenting to [facility] from home due to hypoxemia and somnolence with difficulty of breathing.  History was obtained from family at bedside family had noticed that patient had been hypoxemic and not tolerating her BiPAP as much at home which prompted them to come to the emergency department for further evaluation.  Patient is a rather poor historian and unable to provide me with any meaningful information due to her current mental state.  Given concern for hypoxic respiratory failure, CT was consulted however at this time patient's CODE STATUS has been changed to DNR CCA and DO NOT INTUBATE.  Per chart review, patient has advanced COPD and has been on noninvasive ventilation for about 4 years with hypoxemic respiratory failure on 3 L nasal cannula throughout the day and BiPAP at  night.  Per family, they are having trouble oxygenating the patient for the last few days and noted her O2 saturation has been dropping to the 60s and has been getting much more fatigued and lethargic and confused which prompted her not to be eating or drinking as much.  While patient was in the emergency department, venous blood gas showed pCO2 of 123 with pH 7.33.  Chest x-ray shows consolidation consistent with pneumonia and patient tested positive for COVID-19 today.  There is also noted that patient tested positive for COVID-19 back in November 2023.  Currently patient is on BiPAP 15/5 with 40% FiO2 saturating in the 90s .    Daily progress:  January 15, 2024: Patient is a chronic retainer with a CO2 in the 100s.  She was placed on BiPAP 4 hours on, 4 hours off overnight.  She is awake and interactive today however repeated blood gases showed a pH of 7.35 and pCO2 of 125.  She remains on supplemental oxygen.  Continues to be on remdesivir, Decadron, and meropenem.  Vancomycin was stopped as MRSA swab was negative.    January 16, 2024: Patient continues to require BiPAP intermittently alternating with supplemental oxygen.  She was awake and arousable this morning but falls asleep easily.  ABGs showed acute on chronic hypoxic and hypercapnic respiratory failure.  Patient remains on Eliquis and was started on beta-blockers.  Responds to questions appropriately when asked.    Past Medical/Surgical History:   Past Medical History:   Diagnosis Date    Diaphragmatic hernia without obstruction or gangrene 10/27/2019    Hiatal hernia    Diverticulosis of intestine, part unspecified, without perforation or abscess without bleeding 08/16/2016    Diverticulosis    Personal history of diseases of the blood and blood-forming organs and certain disorders involving the immune mechanism     History of anemia    Personal history of other diseases of the circulatory system     History of hypertension    Personal history of other  diseases of the digestive system     History of gastroesophageal reflux (GERD)    Personal history of other diseases of the nervous system and sense organs     History of hearing loss    Personal history of other diseases of the respiratory system     History of asthma    Personal history of other diseases of the respiratory system     History of chronic obstructive lung disease    Personal history of other diseases of the respiratory system     History of bronchitis    Personal history of other diseases of urinary system     H/O bladder problems    Personal history of other endocrine, nutritional and metabolic disease     History of hypothyroidism    Personal history of other endocrine, nutritional and metabolic disease     History of hyperlipidemia    Personal history of other endocrine, nutritional and metabolic disease     History of thyroid disorder    Personal history of other specified conditions     History of heartburn    Personal history of pneumonia (recurrent)     History of pneumonia    Shortness of breath     SOB (shortness of breath) on exertion    Unspecified urinary incontinence     Incontinence     Past Surgical History:   Procedure Laterality Date    OTHER SURGICAL HISTORY  06/28/2019    Tonsillectomy with adenoidectomy    TONSILLECTOMY  05/12/2016    Tonsillectomy       Family History:   No relevant family history has been documented for this patient.    Allergies:     Allergies   Allergen Reactions    Bactrim [Sulfamethoxazole-Trimethoprim] GI Upset    Cefadroxil GI Upset    Clarithromycin GI Upset    Codeine GI Upset    Erythromycin GI Upset         Social history:   reports that she does not drink alcohol and does not use drugs.    Current Medications:   No recently discontinued medications to reconcile    Review of Systems:   General: denies weight gain, denies loss of appetite, fever, chills, night sweats.  HEENT: denies headaches, dizziness, head trauma, visual changes, eye pain, tinnitus,  nosebleeds, hoarseness or throat pain    Respiratory: denies chest pain, +ve dyspnea, cough but no hemoptysis  Cardiovascular: denies orthopnea, paroxysmal nocturnal dyspnea, leg swelling, and previous heart attack.    Gastrointestinal: denies pain, nausea vomiting, diarrhea, constipation, melena or bleeding.  Genitourinary: denies hematuria, frequency, urgency or dysuria  Neurology: denies syncope, seizures, paralysis, paraesthesia   Endocrine: denies polyuria, polydipsia, skin or hair changes, and heat or cold intolerance  Musculoskeletal: denies joint pain, swelling, arthritis or myalgia  Hematologic: denies bleeding, adenopathy and easy bruising  Skin: denies rashes and skin discoloration  Psychiatry: denies depression    Physical Exam:   Vital Signs:   Vitals:    01/16/24 1044   BP:    Pulse:    Resp:    Temp:    SpO2: 92%       Input/Output:    Intake/Output Summary (Last 24 hours) at 1/16/2024 1311  Last data filed at 1/16/2024 1012  Gross per 24 hour   Intake 1324.99 ml   Output --   Net 1324.99 ml         Oxygen requirements:    Ventilator Information:  FiO2 (%):  [28 %-40 %] 40 %  S RR:  [18] 18          General appearance: ill looking, lethargic, Not in pain or distress, in no respiratory distress    HEENT: Atraumatic/normocephalic, EOMI, LEXI, pharynx clear, moist mucosa, redness of the uvula appreciated,   Neck: Supple, no jugular venous distension, lymphadenopathy, thyromegaly or carotid bruits  Chest: Severely diminised breath sounds, no wheezing, no crackles and no tenderness over ribs   Cardiovascular: Normal S1, S2, regular rate and rhythm, no murmur, rub or gallop  Abdomen: Normal sounds present, soft, lax with no tenderness, no hepatosplenomegaly, and no masses  Extremities: No edema. Pulses are equally present.   Skin: intact, no rashes   Neurologic: Alert and oriented x 2-3, No focal deficit     Investigations:  Labs, radiological imaging and cardiac work up were personally  reviewed          .       STAFF PHYSICIAN NOTE OF PERSONAL INVOLVEMENT IN CARE  As the attending physician, I certify that I personally reviewed the patient's history and personally examined the patient to confirm the physical findings described above, and that I reviewed the relevant imaging studies and available reports.  I also discussed the differential diagnosis and all of the proposed management plans with the patient and individuals accompanying the patient to this visit.  They had the opportunity to ask questions about the proposed management plans and to have those questions answered.

## 2024-01-16 NOTE — PROGRESS NOTES
Physical Therapy                 Therapy Communication Note    Patient Name: Fidelina Milton  MRN: [id number]  Today's Date: 1/16/2024     Discipline: Physical Therapy    Missed Visit Reason: Patient placed on medical hold (As per nursing, pt just went off Bipap and is now trialing 5LPM at rest and will be placed back on Bipap if needed. Pt is not appropriate for PT at this time. Will reattempt evaluation once pt is medically stable and able to participate.)    Missed Time: Attempt

## 2024-01-16 NOTE — CARE PLAN
Problem: Skin  Goal: Decreased wound size/increased tissue granulation at next dressing change  Outcome: Progressing  Goal: Participates in plan/prevention/treatment measures  Outcome: Progressing  Goal: Prevent/manage excess moisture  Outcome: Progressing  Goal: Prevent/minimize sheer/friction injuries  Outcome: Progressing  Goal: Promote/optimize nutrition  Outcome: Progressing  Goal: Promote skin healing  Outcome: Progressing   The patient's goals for the shift include      The clinical goals for the shift include patient will wear CPAP/BiPAP all night    Problem: Skin  Goal: Decreased wound size/increased tissue granulation at next dressing change  1/16/2024 1155 by Collette Smith, RN  Flowsheets (Taken 1/16/2024 1155)  Decreased wound size/increased tissue granulation at next dressing change: Utilize specialty bed per algorithm  1/16/2024 1154 by Collette Smith, RN  Outcome: Progressing  Goal: Participates in plan/prevention/treatment measures  1/16/2024 1155 by Collette Smith, RN  Flowsheets (Taken 1/16/2024 1155)  Participates in plan/prevention/treatment measures: Discuss with provider PT/OT consult  1/16/2024 1154 by Collette Smith, RN  Outcome: Progressing  Goal: Prevent/manage excess moisture  1/16/2024 1155 by Collette Smith, RN  Flowsheets (Taken 1/16/2024 1155)  Prevent/manage excess moisture: Moisturize dry skin  1/16/2024 1154 by Collette Smith, RN  Outcome: Progressing  Goal: Prevent/minimize sheer/friction injuries  1/16/2024 1155 by Collette Smith, RN  Flowsheets (Taken 1/16/2024 1155)  Prevent/minimize sheer/friction injuries: Increase activity/out of bed for meals  1/16/2024 1154 by Collette Smith, RN  Outcome: Progressing  Goal: Promote/optimize nutrition  1/16/2024 1155 by Collette Smith, RN  Flowsheets (Taken 1/16/2024 1155)  Promote/optimize nutrition: Assist with feeding  1/16/2024 1154 by Collette Smith, RN  Outcome: Progressing  Goal: Promote skin healing  1/16/2024 1155 by Collette  ELSA Harris  Flowsheets (Taken 1/16/2024 1155)  Promote skin healing: Assess skin/pad under line(s)/device(s)  1/16/2024 1154 by Collette Smith, RN  Outcome: Progressing

## 2024-01-16 NOTE — PROGRESS NOTES
Fidelina Milton is a 83 y.o. female on day 3 of admission presenting with Shortness of breath.      Subjective   Patient is a 83 year old female on day 3 of admission presenting with shortness of breath. Patient has a history of  Hypothyroidism, asthma, hypertension, GERD, MGUS, chronic anemia, hyperlipidemia, history of COPD on BiPAP.       Objective     Last Recorded Vitals  /56 (BP Location: Right arm, Patient Position: Lying)   Pulse 73   Temp 36 °C (96.8 °F) (Temporal)   Resp 22   Wt (!) 43.1 kg (95 lb)   SpO2 94%   Intake/Output last 3 Shifts:    Intake/Output Summary (Last 24 hours) at 1/16/2024 0957  Last data filed at 1/15/2024 2259  Gross per 24 hour   Intake 1224.99 ml   Output --   Net 1224.99 ml       Admission Weight  Weight: (!) 43.1 kg (95 lb) (01/13/24 1431)    Daily Weight  01/13/24 : (!) 43.1 kg (95 lb)    Image Results  ECG 12 lead  Atrial fibrillation with rapid ventricular response with premature ventricular or aberrantly conducted complexes  Lateral infarct (cited on or before 15-ISRAEL-2024)  Abnormal ECG  When compared with ECG of 15-ISRAEL-2024 16:56, (unconfirmed)  Serial changes of evolving Lateral infarct Present      Physical Exam  Constitutional:       General: She is awake.      Appearance: Normal appearance.   Cardiovascular:      Rate and Rhythm: Normal rate and regular rhythm.      Heart sounds: Normal heart sounds.   Pulmonary:      Effort: Pulmonary effort is normal.      Breath sounds: Normal breath sounds and air entry.   Abdominal:      General: Bowel sounds are normal.      Palpations: Abdomen is soft.   Neurological:      General: No focal deficit present.      Mental Status: She is alert and oriented to person, place, and time.   Psychiatric:         Attention and Perception: Attention normal.         Mood and Affect: Mood and affect normal.         Speech: Speech normal.         Behavior: Behavior normal.         Relevant Results      Scheduled medications  apixaban,  2.5 mg, oral, q12h  dexAMETHasone, 6 mg, intravenous, q12h  [Held by provider] furosemide, 80 mg, oral, Daily  ipratropium-albuteroL, 3 mL, nebulization, q4h  levothyroxine, 50 mcg, oral, Daily  meropenem, 1 g, intravenous, q12h  metoprolol tartrate, 25 mg, oral, BID  pantoprazole, 40 mg, oral, Daily before breakfast   Or  pantoprazole, 40 mg, intravenous, Daily before breakfast  remdesivir, 100 mg, intravenous, q24h      Continuous medications  D5 % and 0.9 % sodium chloride, 60 mL/hr, Last Rate: 60 mL/hr (01/16/24 0811)      PRN medications  PRN medications: acetaminophen **OR** acetaminophen **OR** acetaminophen, ammonium lactate, guaiFENesin, ipratropium-albuteroL, oxygen, oxygen, polyethylene glycol  Results for orders placed or performed during the hospital encounter of 01/13/24 (from the past 24 hour(s))   Blood Gas Arterial Full Panel   Result Value Ref Range    POCT pH, Arterial 7.35 (L) 7.38 - 7.42 pH    POCT pCO2, Arterial 125 (HH) 38 - 42 mm Hg    POCT pO2, Arterial 120 (H) 85 - 95 mm Hg    POCT SO2, Arterial 100 94 - 100 %    POCT Oxy Hemoglobin, Arterial 96.8 94.0 - 98.0 %    POCT Hematocrit Calculated, Arterial 32.0 (L) 36.0 - 46.0 %    POCT Sodium, Arterial 140 136 - 145 mmol/L    POCT Potassium, Arterial 3.7 3.5 - 5.3 mmol/L    POCT Chloride, Arterial 92 (L) 98 - 107 mmol/L    POCT Ionized Calcium, Arterial 1.07 (L) 1.10 - 1.33 mmol/L    POCT Glucose, Arterial 108 (H) 74 - 99 mg/dL    POCT Lactate, Arterial 0.5 0.4 - 2.0 mmol/L    POCT Base Excess, Arterial 36.6 (H) -2.0 - 3.0 mmol/L    POCT HCO3 Calculated, Arterial 69.0 (H) 22.0 - 26.0 mmol/L    POCT Hemoglobin, Arterial 10.7 (L) 12.0 - 16.0 g/dL    POCT Anion Gap, Arterial -17 (L) 10 - 25 mmo/L    Patient Temperature 37.0 degrees Celsius    FiO2 32 %    Critical Called By Tamy Marr RRT     Critical Called To      Critical Call Time [phone]     Critical Read Back Y               Assessment/Plan   This patient currently has cardiac  telemetry ordered; if you would like to modify or discontinue the telemetry order, click here to go to the orders activity to modify/discontinue the order.          Principal Problem:    Shortness of breath    Patient was fully evaluated on January 16, 2024. ABG and ECHO needed. Will recheck labs in AM.  Patient with A-fib with RVR converted to sinus rhythm with Cardizem.  Clinically improved.  Echocardiogram ordered.              Malika Otero

## 2024-01-16 NOTE — PROGRESS NOTES
Occupational Therapy                 Therapy Communication Note    Patient Name: Fidelina Milton  MRN: [id number]  Today's Date: 1/16/2024     Discipline: Occupational Therapy    Missed Visit Reason: Pt. on medical hold due to being just taken off continuous bipap and now trialing 5 liter O2. Will be put back on bipap if needed so not appropriate for increased activity with therapy evaluation at this time.

## 2024-01-16 NOTE — CONSULTS
Cardiology Consult    Impression:  COVID-19 pneumonia  Superimposed bilateral bacterial pneumonia  Acute on chronic hypoxic & hypercapnic respiratory failure requiring BiPAP  Paroxysmal atrial fibrillation.  Converted spontaneously.  [id number] = 4.  Severe pulmonary hypertension   Interstitial lung disease  Hypertension  Anemia  Plan:  Management of hypoxic and hypercapnic respiratory failure per pulmonary.  Start low-dose beta-blocker  Start Eliquis for thromboembolic protection.  CC  Shortness of breath  HPI:  83-year-old woman brought to hospital 1/13/2024 with hypoxemia and somnolence.  pCO2 was over 125.  She was placed on BiPAP.  Found to have COVID-19 pneumonia and possibly superimposed bacterial pneumonia.  This occurred in the setting of advanced COPD and chronic respiratory failure.  She has been followed closely by pulmonary.  Yesterday evening she went into rapid A-fib.  She received a dose of IV dig and IV beta-blocker and converted to sinus rhythm.  She has maintained sinus rhythm overnight.  She has no awareness of the arrhythmia.  Meds:  Scheduled medications  dexAMETHasone, 6 mg, intravenous, q12h  digoxin, 250 mcg, intravenous, Daily  [Held by provider] furosemide, 80 mg, oral, Daily  heparin (porcine), 5,000 Units, subcutaneous, q8h  ipratropium-albuteroL, 3 mL, nebulization, q4h  levothyroxine, 50 mcg, oral, Daily  meropenem, 1 g, intravenous, q12h  pantoprazole, 40 mg, oral, Daily before breakfast   Or  pantoprazole, 40 mg, intravenous, Daily before breakfast  remdesivir, 100 mg, intravenous, q24h      Continuous medications  D5 % and 0.9 % sodium chloride, 60 mL/hr, Last Rate: 60 mL/hr (01/16/24 0811)  dilTIAZem, 5-15 mg/hr      PRN medications  PRN medications: acetaminophen **OR** acetaminophen **OR** acetaminophen, ammonium lactate, guaiFENesin, ipratropium-albuteroL, oxygen, oxygen, polyethylene glycol    PMHx:  Advanced COPD.  Chronic respiratory  failure  Asthma  Hypertension  GERD  Hypothyroid  Anemia  Hyperlipidemia  MGUS  Social history:  Lives in the community.  Ex-smoker.  No alcohol.  Family history:  Noncontributory  Review of systems:  See HPI  Physical exam:  Vitals:    01/16/24 0913   BP: 121/56   Pulse: 73   Resp:    Temp: 36 °C (96.8 °F)   SpO2: 94%      Physical exam not performed  EKG:  EKG 1/15/2024 showing A-fib  Telemetry currently showing sinus rhythm  Echo:  No results found for this or any previous visit.   Labs:  Lab Results   Component Value Date    WBC 2.9 (L) 01/14/2024    HGB 10.3 (L) 01/14/2024    HCT 35.7 (L) 01/14/2024     01/14/2024    ALT 6 (L) 01/13/2024    AST 16 01/13/2024     01/13/2024    K 3.9 01/13/2024    CL 83 (L) 01/13/2024    CREATININE 0.76 01/13/2024    BUN 17 01/13/2024    CO2 >45 (HH) 01/13/2024    TSH 3.39 03/01/2023    INR 1.0 10/25/2019     par

## 2024-01-17 ENCOUNTER — APPOINTMENT (OUTPATIENT)
Dept: CARDIOLOGY | Facility: HOSPITAL | Age: 84
DRG: 871 | End: 2024-01-17
Payer: MEDICARE

## 2024-01-17 LAB — M PNEUMO IGM SER IA-ACNC: 0.07 U/L

## 2024-01-17 PROCEDURE — 2060000001 HC INTERMEDIATE ICU ROOM DAILY

## 2024-01-17 PROCEDURE — 2500000004 HC RX 250 GENERAL PHARMACY W/ HCPCS (ALT 636 FOR OP/ED)

## 2024-01-17 PROCEDURE — 94660 CPAP INITIATION&MGMT: CPT

## 2024-01-17 PROCEDURE — 94640 AIRWAY INHALATION TREATMENT: CPT

## 2024-01-17 PROCEDURE — 5A0935A ASSISTANCE WITH RESPIRATORY VENTILATION, LESS THAN 24 CONSECUTIVE HOURS, HIGH NASAL FLOW/VELOCITY: ICD-10-PCS | Performed by: INTERNAL MEDICINE

## 2024-01-17 PROCEDURE — 2500000001 HC RX 250 WO HCPCS SELF ADMINISTERED DRUGS (ALT 637 FOR MEDICARE OP): Performed by: INTERNAL MEDICINE

## 2024-01-17 PROCEDURE — 2500000004 HC RX 250 GENERAL PHARMACY W/ HCPCS (ALT 636 FOR OP/ED): Performed by: INTERNAL MEDICINE

## 2024-01-17 PROCEDURE — 2500000002 HC RX 250 W HCPCS SELF ADMINISTERED DRUGS (ALT 637 FOR MEDICARE OP, ALT 636 FOR OP/ED): Performed by: STUDENT IN AN ORGANIZED HEALTH CARE EDUCATION/TRAINING PROGRAM

## 2024-01-17 RX ORDER — HYDRALAZINE HYDROCHLORIDE 20 MG/ML
10 INJECTION INTRAMUSCULAR; INTRAVENOUS EVERY 6 HOURS PRN
Status: DISCONTINUED | OUTPATIENT
Start: 2024-01-17 | End: 2024-01-21 | Stop reason: HOSPADM

## 2024-01-17 RX ADMIN — DEXAMETHASONE SODIUM PHOSPHATE 6 MG: 10 INJECTION INTRAMUSCULAR; INTRAVENOUS at 12:48

## 2024-01-17 RX ADMIN — IPRATROPIUM BROMIDE AND ALBUTEROL SULFATE 3 ML: .5; 3 SOLUTION RESPIRATORY (INHALATION) at 08:04

## 2024-01-17 RX ADMIN — IPRATROPIUM BROMIDE AND ALBUTEROL SULFATE 3 ML: .5; 3 SOLUTION RESPIRATORY (INHALATION) at 23:25

## 2024-01-17 RX ADMIN — APIXABAN 2.5 MG: 2.5 TABLET, FILM COATED ORAL at 20:54

## 2024-01-17 RX ADMIN — APIXABAN 2.5 MG: 2.5 TABLET, FILM COATED ORAL at 10:48

## 2024-01-17 RX ADMIN — IPRATROPIUM BROMIDE AND ALBUTEROL SULFATE 3 ML: .5; 3 SOLUTION RESPIRATORY (INHALATION) at 10:32

## 2024-01-17 RX ADMIN — MEROPENEM 1 G: 1 INJECTION, POWDER, FOR SOLUTION INTRAVENOUS at 23:54

## 2024-01-17 RX ADMIN — IPRATROPIUM BROMIDE AND ALBUTEROL SULFATE 3 ML: .5; 3 SOLUTION RESPIRATORY (INHALATION) at 03:29

## 2024-01-17 RX ADMIN — MEROPENEM 1 G: 1 INJECTION, POWDER, FOR SOLUTION INTRAVENOUS at 10:49

## 2024-01-17 RX ADMIN — METOPROLOL TARTRATE 25 MG: 25 TABLET, FILM COATED ORAL at 10:48

## 2024-01-17 RX ADMIN — Medication 100 MG: at 00:16

## 2024-01-17 RX ADMIN — LEVOTHYROXINE SODIUM 50 MCG: 50 TABLET ORAL at 05:53

## 2024-01-17 RX ADMIN — IPRATROPIUM BROMIDE AND ALBUTEROL SULFATE 3 ML: .5; 3 SOLUTION RESPIRATORY (INHALATION) at 19:13

## 2024-01-17 RX ADMIN — PANTOPRAZOLE SODIUM 40 MG: 40 TABLET, DELAYED RELEASE ORAL at 05:53

## 2024-01-17 RX ADMIN — DEXTROSE AND SODIUM CHLORIDE 60 ML/HR: 5; 900 INJECTION, SOLUTION INTRAVENOUS at 05:51

## 2024-01-17 RX ADMIN — IPRATROPIUM BROMIDE AND ALBUTEROL SULFATE 3 ML: .5; 3 SOLUTION RESPIRATORY (INHALATION) at 14:05

## 2024-01-17 RX ADMIN — DEXAMETHASONE SODIUM PHOSPHATE 6 MG: 10 INJECTION INTRAMUSCULAR; INTRAVENOUS at 23:54

## 2024-01-17 RX ADMIN — METOPROLOL TARTRATE 25 MG: 25 TABLET, FILM COATED ORAL at 20:54

## 2024-01-17 ASSESSMENT — COGNITIVE AND FUNCTIONAL STATUS - GENERAL
PERSONAL GROOMING: A LOT
TURNING FROM BACK TO SIDE WHILE IN FLAT BAD: A LOT
EATING MEALS: A LOT
TOILETING: TOTAL
HELP NEEDED FOR BATHING: TOTAL
MOBILITY SCORE: 8
CLIMB 3 TO 5 STEPS WITH RAILING: TOTAL
DAILY ACTIVITIY SCORE: 8
MOVING FROM LYING ON BACK TO SITTING ON SIDE OF FLAT BED WITH BEDRAILS: A LOT
DRESSING REGULAR UPPER BODY CLOTHING: TOTAL
STANDING UP FROM CHAIR USING ARMS: TOTAL
WALKING IN HOSPITAL ROOM: TOTAL
DRESSING REGULAR LOWER BODY CLOTHING: TOTAL
MOVING TO AND FROM BED TO CHAIR: TOTAL

## 2024-01-17 ASSESSMENT — PAIN SCALES - GENERAL
PAINLEVEL_OUTOF10: 0 - NO PAIN
PAINLEVEL_OUTOF10: 0 - NO PAIN

## 2024-01-17 ASSESSMENT — PAIN - FUNCTIONAL ASSESSMENT: PAIN_FUNCTIONAL_ASSESSMENT: 0-10

## 2024-01-17 NOTE — PROGRESS NOTES
Speech-Language Pathology                 Therapy Communication Note    Patient Name: Fidelina Milton  MRN: [id number]  Today's Date: 1/17/2024     Discipline: Speech Language Pathology    Missed Visit Reason: Ongoing respiratory instability/compromise noted this morning. RT placed pt on BiPap due to saturations dropping to the 70's even on 15liters NC. Would recommend NPO due to heightened risk of aspiration given COVID-19 infection and respiratory compromise. Following aggressive oral care, would allow 5-6 ice chips to prevent colonization of the oropharynx, and to promote swallowing stimulation. NSG (Kya) aware of ST attempt.    Missed Time: Attempt

## 2024-01-17 NOTE — PROGRESS NOTES
Physical Therapy    Discharge Summary    Name: Fidelina Milton  MRN: [id number]  : 1940  Date: 24    Discharge Summary: PT    Discharge Reason: PT Screen: Per RN pt continues to require continuous Bipap due to desaturation to 70s even on 15LNC and is not appropriate for PT services. Due to pts medical status, PT will discharge pt from caseload at this time. If pt's status improves and is able to participate, please re-order.

## 2024-01-17 NOTE — CARE PLAN
The patient's goals for the shift include  rest    The clinical goals for the shift include decreased oxygen demands

## 2024-01-17 NOTE — CONSULTS
Assessment and Plan  Patient is 83 y.o. female  with the following medical Problems:  COVID-19 pneumonia  Bilateral acquired pneumonia likely bacterial.  Acute on chronic hypoxic & hypercapnic respiratory failure requiring BiPAP  Severe pulmonary hypertension based on echocardiography dated February 28, 2019  Interstitial lung disease  COPD with acute exacerbation  Bronchiectasis    Plan of Care:  Continue with supplemental oxygen to keep sat 88-94  Will attempt heated high flow oxygen.  BiPAP while sleeping, napping, and as needed.  Continue with meropenem. Vancomycin was stopped as MRSA swab was negative  Continue with remdesivir and Decadron for COVID-19 infection  DVT prophylaxis.  Patient is currently on Eliquis 2.5 mg twice daily for atrial fibrillation which covers for DVT prophylaxis.  Was of care were discussed with the family on admission.  Patient is DNR CCA  Repeated chest x-ray on January 16, 2024 showed worsening infiltrate.  Speech therapy evaluation      History of Present Illness:   History is mostly obtained from the chart as patient is unable to provide history.  Patient is a 83 y.o. female with Hypothyroidism, asthma, hypertension, GERD, MGUS, chronic anemia, hyperlipidemia, history of COPD on BiPAP presenting to [facility] from home due to hypoxemia and somnolence with difficulty of breathing.  History was obtained from family at bedside family had noticed that patient had been hypoxemic and not tolerating her BiPAP as much at home which prompted them to come to the emergency department for further evaluation.  Patient is a rather poor historian and unable to provide me with any meaningful information due to her current mental state.  Given concern for hypoxic respiratory failure, CT was consulted however at this time patient's CODE STATUS has been changed to DNR CCA and DO NOT INTUBATE.  Per chart review, patient has advanced COPD and has been on noninvasive ventilation for about 4 years with  hypoxemic respiratory failure on 3 L nasal cannula throughout the day and BiPAP at night.  Per family, they are having trouble oxygenating the patient for the last few days and noted her O2 saturation has been dropping to the 60s and has been getting much more fatigued and lethargic and confused which prompted her not to be eating or drinking as much.  While patient was in the emergency department, venous blood gas showed pCO2 of 123 with pH 7.33.  Chest x-ray shows consolidation consistent with pneumonia and patient tested positive for COVID-19 today.  There is also noted that patient tested positive for COVID-19 back in November 2023.  Currently patient is on BiPAP 15/5 with 40% FiO2 saturating in the 90s .    Daily progress:  January 15, 2024: Patient is a chronic retainer with a CO2 in the 100s.  She was placed on BiPAP 4 hours on, 4 hours off overnight.  She is awake and interactive today however repeated blood gases showed a pH of 7.35 and pCO2 of 125.  She remains on supplemental oxygen.  Continues to be on remdesivir, Decadron, and meropenem.  Vancomycin was stopped as MRSA swab was negative.    January 16, 2024: Patient continues to require BiPAP intermittently alternating with supplemental oxygen.  She was awake and arousable this morning but falls asleep easily.  ABGs showed acute on chronic hypoxic and hypercapnic respiratory failure.  Patient remains on Eliquis and was started on beta-blockers.  Responds to questions appropriately when asked.    January 17, 2024: Patient's clinical continues to be on supplemental oxygen alternating with BiPAP.  She has no fever, chills, rigors.  She continues to desaturate with 15 L high flow oxygen.  She is declining quickly.  Patient's oxygen requirement has increased significantly and she is becoming critically ill.  Patient is currently DNR/DNI.    Past Medical/Surgical History:   Past Medical History:   Diagnosis Date    Diaphragmatic hernia without obstruction or  gangrene 10/27/2019    Hiatal hernia    Diverticulosis of intestine, part unspecified, without perforation or abscess without bleeding 08/16/2016    Diverticulosis    Personal history of diseases of the blood and blood-forming organs and certain disorders involving the immune mechanism     History of anemia    Personal history of other diseases of the circulatory system     History of hypertension    Personal history of other diseases of the digestive system     History of gastroesophageal reflux (GERD)    Personal history of other diseases of the nervous system and sense organs     History of hearing loss    Personal history of other diseases of the respiratory system     History of asthma    Personal history of other diseases of the respiratory system     History of chronic obstructive lung disease    Personal history of other diseases of the respiratory system     History of bronchitis    Personal history of other diseases of urinary system     H/O bladder problems    Personal history of other endocrine, nutritional and metabolic disease     History of hypothyroidism    Personal history of other endocrine, nutritional and metabolic disease     History of hyperlipidemia    Personal history of other endocrine, nutritional and metabolic disease     History of thyroid disorder    Personal history of other specified conditions     History of heartburn    Personal history of pneumonia (recurrent)     History of pneumonia    Shortness of breath     SOB (shortness of breath) on exertion    Unspecified urinary incontinence     Incontinence     Past Surgical History:   Procedure Laterality Date    OTHER SURGICAL HISTORY  06/28/2019    Tonsillectomy with adenoidectomy    TONSILLECTOMY  05/12/2016    Tonsillectomy       Family History:   No relevant family history has been documented for this patient.    Allergies:     Allergies   Allergen Reactions    Bactrim [Sulfamethoxazole-Trimethoprim] GI Upset    Cefadroxil GI Upset     Clarithromycin GI Upset    Codeine GI Upset    Erythromycin GI Upset         Social history:   reports that she does not drink alcohol and does not use drugs.    Current Medications:   No recently discontinued medications to reconcile    Review of Systems:   General: denies weight gain, denies loss of appetite, fever, chills, night sweats.  HEENT: denies headaches, dizziness, head trauma, visual changes, eye pain, tinnitus, nosebleeds, hoarseness or throat pain    Respiratory: denies chest pain, +ve dyspnea, cough but no hemoptysis  Cardiovascular: denies orthopnea, paroxysmal nocturnal dyspnea, leg swelling, and previous heart attack.    Gastrointestinal: denies pain, nausea vomiting, diarrhea, constipation, melena or bleeding.  Genitourinary: denies hematuria, frequency, urgency or dysuria  Neurology: denies syncope, seizures, paralysis, paraesthesia   Endocrine: denies polyuria, polydipsia, skin or hair changes, and heat or cold intolerance  Musculoskeletal: denies joint pain, swelling, arthritis or myalgia  Hematologic: denies bleeding, adenopathy and easy bruising  Skin: denies rashes and skin discoloration  Psychiatry: denies depression    Physical Exam:   Vital Signs:   Vitals:    01/17/24 1124   BP: 179/77   Pulse:    Resp:    Temp: 36.1 °C (97 °F)   SpO2:        Input/Output:    Intake/Output Summary (Last 24 hours) at 1/17/2024 1405  Last data filed at 1/16/2024 2334  Gross per 24 hour   Intake 1456.67 ml   Output --   Net 1456.67 ml         Oxygen requirements:    Ventilator Information:  FiO2 (%):  [40 %] 40 %  S RR:  [18] 18          General appearance: ill looking, lethargic, Not in pain or distress, in no respiratory distress    HEENT: Atraumatic/normocephalic, EOMI, LEXI, pharynx clear, moist mucosa, redness of the uvula appreciated,   Neck: Supple, no jugular venous distension, lymphadenopathy, thyromegaly or carotid bruits  Chest: Severely diminised breath sounds, no wheezing, no crackles and  no tenderness over ribs   Cardiovascular: Normal S1, S2, regular rate and rhythm, no murmur, rub or gallop  Abdomen: Normal sounds present, soft, lax with no tenderness, no hepatosplenomegaly, and no masses  Extremities: No edema. Pulses are equally present.   Skin: intact, no rashes   Neurologic: Alert and oriented x 1-2, No focal deficit     Investigations:  Labs, radiological imaging and cardiac work up were personally reviewed        ICU STAFF PHYSICIAN NOTE OF PERSONAL INVOLVEMENT IN CARE  As the attending physician, I certify that I personally reviewed the patient's history and personally examined the patient to confirm the physical findings described above, and that I reviewed the relevant imaging studies and available reports.  I also discussed the differential diagnosis and all of the proposed management plans with the patient and individuals accompanying the patient to this visit.  They had the opportunity to ask questions about the proposed management plans and to have those questions answered.    This patient has a high probability of sudden, clinically significant deterioration, which requires the highest level of physician preparedness to intervene urgently.  I managed/supervised life or organ supporting interventions that required frequent physician assessment.  I devoted my full attention to the direct care of this patient for the amount of time indicated below.  Time I spent with family or surrogate(s) is included only if the patient was incapable of providing the necessary information or participating in medical decision-making.  Time devoted to teaching and to any procedures I billed separately is not included.  Critical Care Time: 31 minutes

## 2024-01-17 NOTE — PROGRESS NOTES
Occupational Therapy    Discharge Summary    Name: Fidelina Milton  MRN: [id number]  : 1940  Date: 24    Discharge Summary: OT    Discharge Reason: OT Screen: Per RN pt continues to require continuous Bipap due to desaturation to 70s even on 15LNC and is not appropriate for OT services. Due to pts medical status, OT will discharge pt from caseload at this time. If pt's status improves and is able to participate, please re-order.

## 2024-01-17 NOTE — PROGRESS NOTES
Cardiology Progress    Impression:  COVID-19 pneumonia  Superimposed bilateral bacterial pneumonia  Acute on chronic hypoxic & hypercapnic respiratory failure requiring BiPAP  Paroxysmal atrial fibrillation.  Converted spontaneously.  [id number] = 4.  Severe pulmonary hypertension   Interstitial lung disease  Hypertension  Anemia  Plan:  Continue beta-blocker and Eliquis.  Beta-blocker dose may be uptitrated as needed for hypertension  Outpatient echo after she recovers from COVID  No new recommendations  HPI:  Doing fine.  Remains sinus rhythm.  Meds:  Scheduled medications  apixaban, 2.5 mg, oral, q12h  dexAMETHasone, 6 mg, intravenous, q12h  [Held by provider] furosemide, 80 mg, oral, Daily  ipratropium-albuteroL, 3 mL, nebulization, q4h  levothyroxine, 50 mcg, oral, Daily  meropenem, 1 g, intravenous, q12h  metoprolol tartrate, 25 mg, oral, BID  pantoprazole, 40 mg, oral, Daily before breakfast   Or  pantoprazole, 40 mg, intravenous, Daily before breakfast  remdesivir, 100 mg, intravenous, q24h      Continuous medications  D5 % and 0.9 % sodium chloride, 60 mL/hr, Last Rate: 60 mL/hr (01/17/24 0551)      PRN medications  PRN medications: acetaminophen **OR** acetaminophen **OR** acetaminophen, ammonium lactate, guaiFENesin, ipratropium-albuteroL, oxygen, oxygen, polyethylene glycol    Physical exam:  Vitals:    01/17/24 1124   BP: 179/77   Pulse:    Resp:    Temp: 36.1 °C (97 °F)   SpO2:       Physical exam not performed  EKG:  Telemetry shows sinus rhythm  Echo:  No results found for this or any previous visit.   Labs:  Lab Results   Component Value Date    WBC 2.9 (L) 01/14/2024    HGB 10.3 (L) 01/14/2024    HCT 35.7 (L) 01/14/2024     01/14/2024    ALT 7 01/16/2024    AST 17 01/16/2024     (H) 01/16/2024    K 3.7 01/16/2024    CL 92 (L) 01/16/2024    CREATININE 0.60 01/16/2024    BUN 19 01/16/2024    CO2 >45 (HH) 01/16/2024    TSH 3.39 03/01/2023    INR 1.0 10/25/2019     par

## 2024-01-17 NOTE — DOCUMENTATION CLARIFICATION NOTE
"    PATIENT:               SHAE GONG  ACCT #:                  [id number]  MRN:                       [id number]  :                       1940  ADMIT DATE:       2024 2:30 PM  DISCH DATE:  RESPONDING PROVIDER #:        [id number]          PROVIDER RESPONSE TEXT:    Sepsis with neurologic organ dysfunction - Metabolic Encephalopathy    CDI QUERY TEXT:    _Sepsis    Instruction:  Based on your assessment of the patient and the clinical information, please provide the requested documentation by clicking on the appropriate radio button and enter any additional information if prompted.    Question: Is there a diagnosis indicative of a patient meeting SIRS criteria and with organ dysfunction in the setting of Covid 19 infection    When answering this query, please exercise your independent professional judgment. The fact that a question is being asked, does not imply that any particular answer is desired or expected.    The patient's clinical indicators include:  Clinical Information:  84 yo admitted with Covid 19, Pneumonia, Metabolic Encephalopathy    Clinical Indicators:  ED \"Wheezing, rhonchi and rales\", \" On examination patient was hypoxic 77\" percent on 4 L nasal cannula.  Patient was subsequently placed on nonrebreather she has diffuse Rales and rhonchi\"    H and P  \"presenting to [facility] from home due to hypoxemia and somnolence with difficulty of breathing\", \"her O2 saturation has been dropping to the 60s and has been getting much more fatigued and lethargic and confused\", \"Chest x-ray shows consolidation consistent with pneumonia and patient tested positive for COVID-19 today\"    24  WBC 4.4  Temp 36.6         HR      RR 17-24  24  WBC 2.9  Temp 35-36.5   HR      RR 18-21    Treatment:  Remdesivir, IV Meropenem, IV Vancomycin, BiPap, Decadron, Lab monitoring, Nebulizer treatments    Risk Factors:  84 yo with COPD, BMI 43, HTN, Asthma  Options provided:  -- Sepsis with " neurologic organ dysfunction - Metabolic Encephalopathy  -- Sepsis with other organ dysfunction, Please specify additional information below  -- Patient treated for Covid 19 without sepsis  -- Other - I will add my own diagnosis  -- Refer to Clinical Documentation Reviewer    Query created by: Rylee Altamirano on 1/15/2024 12:28 PM      Electronically signed by:  MALENA RIZO MD 1/16/2024 8:33 PM

## 2024-01-17 NOTE — PROGRESS NOTES
Fidelina Milton is a 83 y.o. female on day 4 of admission presenting with Shortness of breath.      Subjective   Patient is a 83 year old female on day 3 of admission presenting with shortness of breath. Patient has a history of  Hypothyroidism, asthma, hypertension, GERD, MGUS, chronic anemia, hyperlipidemia, history of COPD on BiPAP.       Objective     Last Recorded Vitals  /77   Pulse 67   Temp 35.4 °C (95.7 °F)   Resp 18   Wt (!) 43.1 kg (95 lb)   SpO2 93%   Intake/Output last 3 Shifts:    Intake/Output Summary (Last 24 hours) at 1/17/2024 1542  Last data filed at 1/16/2024 2334  Gross per 24 hour   Intake 100 ml   Output --   Net 100 ml         Admission Weight  Weight: (!) 43.1 kg (95 lb) (01/13/24 1431)    Daily Weight  01/13/24 : (!) 43.1 kg (95 lb)    Image Results  XR chest 1 view  Narrative: Interpreted By:  Bree Gee,   STUDY:  XR CHEST 1 VIEW;  1/16/2024 1:37 pm      INDICATION:  Signs/Symptoms:COVID.      COMPARISON:  01/13/2024; 04/07/2009      ACCESSION NUMBER(S):  [id number]      ORDERING CLINICIAN:  MICHEL BERUMEN      TECHNIQUE:  2 portable images of the chest were obtained.      FINDINGS:  The patient is markedly rotated. The chin is superimposed on the  right apex. Heart appears normal in size.      There is bilateral parenchymal infiltration. There is no obvious  pleural fluid.      Bones appear osteoporotic.      COMPARISON OF FINDING:  The chest is similar. In 2009 there was bilateral upper lobe scarring.      Impression: Limited exam. Bilateral parenchymal infiltration.      MACRO:  none      Signed by: Bree Gee 1/16/2024 2:39 PM  Dictation workstation:   [id number]  ECG 12 lead  Atrial fibrillation with rapid ventricular response with premature ventricular or aberrantly conducted complexes  Lateral infarct (cited on or before 15-ISRAEL-2024)  Abnormal ECG  When compared with ECG of 15-ISRAEL-2024 16:56, (unconfirmed)  Serial changes of evolving Lateral infarct  Present  Confirmed by Rafa Mcintosh ([id number]) on 1/16/2024 10:49:25 AM      Physical Exam  Constitutional:       General: She is awake.      Appearance: Normal appearance.   Cardiovascular:      Rate and Rhythm: Normal rate and regular rhythm.      Heart sounds: Normal heart sounds.   Pulmonary:      Effort: Pulmonary effort is normal.      Breath sounds: Normal breath sounds and air entry.   Abdominal:      General: Bowel sounds are normal.      Palpations: Abdomen is soft.   Neurological:      General: No focal deficit present.      Mental Status: She is alert and oriented to person, place, and time.   Psychiatric:         Attention and Perception: Attention normal.         Mood and Affect: Mood and affect normal.         Speech: Speech normal.         Behavior: Behavior normal.         Relevant Results      Scheduled medications  apixaban, 2.5 mg, oral, q12h  dexAMETHasone, 6 mg, intravenous, q12h  [Held by provider] furosemide, 80 mg, oral, Daily  ipratropium-albuteroL, 3 mL, nebulization, q4h  levothyroxine, 50 mcg, oral, Daily  meropenem, 1 g, intravenous, q12h  metoprolol tartrate, 25 mg, oral, BID  pantoprazole, 40 mg, oral, Daily before breakfast   Or  pantoprazole, 40 mg, intravenous, Daily before breakfast  remdesivir, 100 mg, intravenous, q24h      Continuous medications  D5 % and 0.9 % sodium chloride, 60 mL/hr, Last Rate: 60 mL/hr (01/17/24 0551)      PRN medications  PRN medications: acetaminophen **OR** acetaminophen **OR** acetaminophen, ammonium lactate, guaiFENesin, ipratropium-albuteroL, oxygen, oxygen, polyethylene glycol  Results for orders placed or performed during the hospital encounter of 01/13/24 (from the past 24 hour(s))   POCT GLUCOSE   Result Value Ref Range    POCT Glucose 86 74 - 99 mg/dL              Assessment/Plan   This patient currently has cardiac telemetry ordered; if you would like to modify or discontinue the telemetry order, click here to go to the orders activity to  modify/discontinue the order.          Principal Problem:    Shortness of breath    Patient was fully evaluated on January 16, 2024. ABG and ECHO needed. Will recheck labs in AM.  Patient with A-fib with RVR converted to sinus rhythm with Cardizem.  Clinically improved.  Echocardiogram ordered.    Patient was fully evaluated on January 17, 2024. Patient is on BIPAP FIO2 40%. Will recheck labs in AM.             Malika Otero

## 2024-01-17 NOTE — CONSULTS
"Nutrition Note  Reason for Assessment  Reason for Assessment: Admission nursing screening    Visit Reason: SOB  Recommendation(s):  Sending Ensure plus high protein X 3 to help meet nutritional needs. Pt would likely do better with a full liquid diet due to her inability to separate from her breathing mask.         Nutrition Assessment    Nutrition History  Food and Nutrient History: Pt was eating very poor before admit.  She is 76% of her IBW.   She is on continuous bipap and can barely separate from it.  She just gets sips of water and her meds when she does separete from it.  She is not really taking anything PO.  Family is considering hospice      Difficulty swallowing: pt is unable to separate from her breathing mask     Per Flowsheet Percent Meal intake:    Dietary Orders (From admission, onward)       Start     Ordered    01/13/24 1922  Adult diet Regular  Diet effective now        Question:  Diet type  Answer:  Regular    01/13/24 1921                     Results from last 7 days   Lab Units 01/16/24  1318 01/13/24  1453   GLUCOSE mg/dL 110* 103*   SODIUM mmol/L 147* 143   POTASSIUM mmol/L 3.7 3.9   CHLORIDE mmol/L 92* 83*   CO2 mmol/L >45* >45*   BUN mg/dL 19 17   CREATININE mg/dL 0.60 0.76   EGFR mL/min/1.73m*2 88 77   CALCIUM mg/dL 7.7* 8.3*     No results found for: \"HGBA1C\"  Results from last 7 days   Lab Units 01/16/24  1650   POCT GLUCOSE mg/dL 86     GI per flowsheet:  Gastrointestinal  Gastrointestinal (WDL): Exceptions to WDL  Abdomen Inspection: Other (Comment)  Last BM Date: 01/11/24  Bowel Incontinence: Yes  Last bowel movement documented: 01/11/24  PMH: COPD; HTN; hypothyroid  Allergies: Bactrim [sulfamethoxazole-trimethoprim], Cefadroxil, Clarithromycin, Codeine, and Erythromycin     Anthropometrics:  Height: 157.5 cm (5' 2.01\")  Weight: (!) 43.1 kg (95 lb 0.3 oz)  BMI (Calculated): 17.37  IBW: 50 kg  %IBW: 76 %      Weight History / % Weight Change: Records indicate pt is down 37 lbs over the " past 4 years.  28%             Estimated Nutritional Needs:  Method for Estimating Needs: 6904-2843  MSJ    Method for Estimating Needs: 50-60  1-1.2 gm kg of IBW    Method for Estimating Needs: 3884-9518 as medically indicated   20-30 ml kg of IBW    Nutrition Focused Physical Findings:   Orbital Fat Pads:  (not appropriate)        Pain Score: 0 - No pain     Nutrition Diagnosis   Patient has Malnutrition Diagnosis: No  Diagnosis Status: New    Patient has Nutrition Diagnosis: Yes  Ongoing  Nutrition Diagnosis 1: Inadequate oral intake  Related to (1): decreased ability to consume sufficient energy  As Evidenced by (1): Pt was eating poor before admit and cannot separate from bipap to eat now.          Nutrition Interventions/Recommendations   Interventions        Individualized Nutrition Prescription Provided for : Sending Ensure plus high protein X 3 to help meet nutritional needs.  Pt would likely do better with a full liquid diet due to her inability to separate from her breathing mask.    Nutrition Monitoring and Evaluation   Biochemical Data, Medical Tests and Procedures  Monitoring and Evaluation Plan: Electrolyte/renal panel, Glucose/endocrine profile  Food/Nutrient Related History Monitoring  Monitoring and Evaluation Plan: Energy intake, Fluid intake, Amount of food    Progress towards goals: Not Met    Education Documentation  No documentation found.      Time Spent (min): 45 minutes  Last Date of Nutrition Visit: 01/17/24  Nutrition Follow-Up Needed?: 3-5 days  Follow up Comment: [id number]

## 2024-01-17 NOTE — PROGRESS NOTES
Patient still requiring high O2 and BiPAP. Per Dr. Wong, patient may be candidate for LTACH. Spoke to spouse at bedside and discussed LTACH level of care and SNF. Spouse agreeable to LTACH and prefers [facility]. Discussed LTACH would be able to provide next level of care, most likely to SNF if appropriate once she is discharged from LTACH. Spouse and family states understanding. Referral sent, awaiting acceptance.  TCC to continue to follow for discharge planning.       FRANKY NAJERA RN TCC

## 2024-01-18 LAB
BACTERIA BLD CULT: NORMAL
BACTERIA BLD CULT: NORMAL

## 2024-01-18 PROCEDURE — 94640 AIRWAY INHALATION TREATMENT: CPT

## 2024-01-18 PROCEDURE — 2500000001 HC RX 250 WO HCPCS SELF ADMINISTERED DRUGS (ALT 637 FOR MEDICARE OP): Performed by: INTERNAL MEDICINE

## 2024-01-18 PROCEDURE — 2500000002 HC RX 250 W HCPCS SELF ADMINISTERED DRUGS (ALT 637 FOR MEDICARE OP, ALT 636 FOR OP/ED): Performed by: STUDENT IN AN ORGANIZED HEALTH CARE EDUCATION/TRAINING PROGRAM

## 2024-01-18 PROCEDURE — 92610 EVALUATE SWALLOWING FUNCTION: CPT | Mod: GN

## 2024-01-18 PROCEDURE — 2500000004 HC RX 250 GENERAL PHARMACY W/ HCPCS (ALT 636 FOR OP/ED)

## 2024-01-18 PROCEDURE — 94660 CPAP INITIATION&MGMT: CPT

## 2024-01-18 PROCEDURE — 2060000001 HC INTERMEDIATE ICU ROOM DAILY

## 2024-01-18 PROCEDURE — 2500000004 HC RX 250 GENERAL PHARMACY W/ HCPCS (ALT 636 FOR OP/ED): Performed by: INTERNAL MEDICINE

## 2024-01-18 RX ADMIN — IPRATROPIUM BROMIDE AND ALBUTEROL SULFATE 3 ML: .5; 3 SOLUTION RESPIRATORY (INHALATION) at 22:57

## 2024-01-18 RX ADMIN — DEXAMETHASONE SODIUM PHOSPHATE 6 MG: 10 INJECTION INTRAMUSCULAR; INTRAVENOUS at 12:34

## 2024-01-18 RX ADMIN — DEXAMETHASONE SODIUM PHOSPHATE 6 MG: 10 INJECTION INTRAMUSCULAR; INTRAVENOUS at 22:51

## 2024-01-18 RX ADMIN — APIXABAN 2.5 MG: 2.5 TABLET, FILM COATED ORAL at 21:07

## 2024-01-18 RX ADMIN — APIXABAN 2.5 MG: 2.5 TABLET, FILM COATED ORAL at 10:01

## 2024-01-18 RX ADMIN — IPRATROPIUM BROMIDE AND ALBUTEROL SULFATE 3 ML: .5; 3 SOLUTION RESPIRATORY (INHALATION) at 11:19

## 2024-01-18 RX ADMIN — MEROPENEM 1 G: 1 INJECTION, POWDER, FOR SOLUTION INTRAVENOUS at 22:51

## 2024-01-18 RX ADMIN — MEROPENEM 1 G: 1 INJECTION, POWDER, FOR SOLUTION INTRAVENOUS at 12:29

## 2024-01-18 RX ADMIN — PANTOPRAZOLE SODIUM 40 MG: 40 TABLET, DELAYED RELEASE ORAL at 10:01

## 2024-01-18 RX ADMIN — IPRATROPIUM BROMIDE AND ALBUTEROL SULFATE 3 ML: .5; 3 SOLUTION RESPIRATORY (INHALATION) at 05:06

## 2024-01-18 RX ADMIN — METOPROLOL TARTRATE 25 MG: 25 TABLET, FILM COATED ORAL at 21:07

## 2024-01-18 RX ADMIN — LEVOTHYROXINE SODIUM 50 MCG: 50 TABLET ORAL at 10:01

## 2024-01-18 RX ADMIN — IPRATROPIUM BROMIDE AND ALBUTEROL SULFATE 3 ML: .5; 3 SOLUTION RESPIRATORY (INHALATION) at 02:56

## 2024-01-18 RX ADMIN — METOPROLOL TARTRATE 25 MG: 25 TABLET, FILM COATED ORAL at 10:01

## 2024-01-18 RX ADMIN — IPRATROPIUM BROMIDE AND ALBUTEROL SULFATE 3 ML: .5; 3 SOLUTION RESPIRATORY (INHALATION) at 14:10

## 2024-01-18 RX ADMIN — DEXTROSE AND SODIUM CHLORIDE 60 ML/HR: 5; 900 INJECTION, SOLUTION INTRAVENOUS at 12:28

## 2024-01-18 RX ADMIN — IPRATROPIUM BROMIDE AND ALBUTEROL SULFATE 3 ML: .5; 3 SOLUTION RESPIRATORY (INHALATION) at 20:58

## 2024-01-18 ASSESSMENT — COGNITIVE AND FUNCTIONAL STATUS - GENERAL
HELP NEEDED FOR BATHING: TOTAL
DRESSING REGULAR LOWER BODY CLOTHING: TOTAL
PERSONAL GROOMING: A LOT
CLIMB 3 TO 5 STEPS WITH RAILING: TOTAL
EATING MEALS: A LOT
DAILY ACTIVITIY SCORE: 8
MOBILITY SCORE: 8
MOVING TO AND FROM BED TO CHAIR: TOTAL
STANDING UP FROM CHAIR USING ARMS: TOTAL
MOBILITY SCORE: 8
MOVING TO AND FROM BED TO CHAIR: TOTAL
TURNING FROM BACK TO SIDE WHILE IN FLAT BAD: A LOT
CLIMB 3 TO 5 STEPS WITH RAILING: TOTAL
WALKING IN HOSPITAL ROOM: TOTAL
EATING MEALS: A LOT
DRESSING REGULAR UPPER BODY CLOTHING: TOTAL
DAILY ACTIVITIY SCORE: 8
PERSONAL GROOMING: A LOT
TOILETING: TOTAL
DRESSING REGULAR UPPER BODY CLOTHING: TOTAL
TOILETING: TOTAL
MOVING FROM LYING ON BACK TO SITTING ON SIDE OF FLAT BED WITH BEDRAILS: A LOT
HELP NEEDED FOR BATHING: TOTAL
DRESSING REGULAR LOWER BODY CLOTHING: TOTAL
MOVING FROM LYING ON BACK TO SITTING ON SIDE OF FLAT BED WITH BEDRAILS: A LOT
TURNING FROM BACK TO SIDE WHILE IN FLAT BAD: A LOT
WALKING IN HOSPITAL ROOM: TOTAL
STANDING UP FROM CHAIR USING ARMS: TOTAL

## 2024-01-18 ASSESSMENT — PAIN - FUNCTIONAL ASSESSMENT: PAIN_FUNCTIONAL_ASSESSMENT: 0-10

## 2024-01-18 ASSESSMENT — PAIN SCALES - GENERAL
PAINLEVEL_OUTOF10: 0 - NO PAIN
PAINLEVEL_OUTOF10: 0 - NO PAIN

## 2024-01-18 NOTE — PROGRESS NOTES
Cardiology Progress    Impression:  COVID-19 pneumonia  Superimposed bilateral bacterial pneumonia  Acute on chronic hypoxic & hypercapnic respiratory failure requiring BiPAP  Paroxysmal atrial fibrillation.  Converted spontaneously.  [id number] = 4.  Severe pulmonary hypertension   Interstitial lung disease  Hypertension  Anemia  Plan:  Continue beta-blocker and Eliquis.    Outpatient echo after he recovers from COVID.  Will sign off.  Follow-up in the office 2 to 3 weeks.  HPI:  Remains sinus rhythm.  No new problems.  Meds:  Scheduled medications  apixaban, 2.5 mg, oral, q12h  dexAMETHasone, 6 mg, intravenous, q12h  [Held by provider] furosemide, 80 mg, oral, Daily  ipratropium-albuteroL, 3 mL, nebulization, q4h  levothyroxine, 50 mcg, oral, Daily  meropenem, 1 g, intravenous, q12h  metoprolol tartrate, 25 mg, oral, BID  pantoprazole, 40 mg, oral, Daily before breakfast   Or  pantoprazole, 40 mg, intravenous, Daily before breakfast      Continuous medications  D5 % and 0.9 % sodium chloride, 60 mL/hr, Last Rate: 60 mL/hr (01/17/24 0551)      PRN medications  PRN medications: acetaminophen **OR** acetaminophen **OR** acetaminophen, ammonium lactate, guaiFENesin, hydrALAZINE, ipratropium-albuteroL, oxygen, oxygen, polyethylene glycol    Physical exam:  Vitals:    01/18/24 0830   BP: 134/60   Pulse:    Resp:    Temp: 35.4 °C (95.7 °F)   SpO2:       Physical exam not performed  EKG:  Telemetry shows sinus rhythm  Echo:  No results found for this or any previous visit.   Labs:  Lab Results   Component Value Date    WBC 2.9 (L) 01/14/2024    HGB 10.3 (L) 01/14/2024    HCT 35.7 (L) 01/14/2024     01/14/2024    ALT 7 01/16/2024    AST 17 01/16/2024     (H) 01/16/2024    K 3.7 01/16/2024    CL 92 (L) 01/16/2024    CREATININE 0.60 01/16/2024    BUN 19 01/16/2024    CO2 >45 (HH) 01/16/2024    TSH 3.39 03/01/2023    INR 1.0 10/25/2019     par

## 2024-01-18 NOTE — CONSULTS
"Nutrition Note  Reason for Assessment  Reason for Assessment:  (follow up)    Visit Reason: SOB  Recommendation(s):  Continue diet texture per pt need, sending ensure plus high protein X 2 and magic cup X 2 to help meet nutritoinal needs         Nutrition Assessment    Nutrition History  Food and Nutrient History: Pt was able to pass her swallow eval today.  She was started on a dysphagia diet.        Difficulty swallowing: pt was started on a dysphasia diet per SLP    Per Flowsheet Percent Meal intake:    Dietary Orders (From admission, onward)       Start     Ordered    01/18/24 1242  Adult diet Regular; Bite size food 6; 1:1 Feeding  Diet effective now        Comments: No mixed consistencies  Pills whole/crushed in puree carrier   Question Answer Comment   Diet type Regular    Texture Bite size food 6    Select tray type: 1:1 Feeding        01/18/24 1241                     Results from last 7 days   Lab Units 01/16/24  1318 01/13/24  1453   GLUCOSE mg/dL 110* 103*   SODIUM mmol/L 147* 143   POTASSIUM mmol/L 3.7 3.9   CHLORIDE mmol/L 92* 83*   CO2 mmol/L >45* >45*   BUN mg/dL 19 17   CREATININE mg/dL 0.60 0.76   EGFR mL/min/1.73m*2 88 77   CALCIUM mg/dL 7.7* 8.3*     No results found for: \"HGBA1C\"  Results from last 7 days   Lab Units 01/16/24  1650   POCT GLUCOSE mg/dL 86     GI per flowsheet:  Gastrointestinal  Gastrointestinal (WDL): Exceptions to WDL  Abdomen Inspection: Soft (concave)  Bowel Sounds: All quadrants  Bowel Sounds (All Quadrants): Hypoactive  Last BM Date: 01/11/24  Bowel Incontinence: Yes  Last bowel movement documented: 01/11/24  PMH:   Allergies: Bactrim [sulfamethoxazole-trimethoprim], Cefadroxil, Clarithromycin, Codeine, and Erythromycin     Anthropometrics:  Height: 157.5 cm (5' 2.01\")  Weight: (!) 43.1 kg (95 lb 0.3 oz)  BMI (Calculated): 17.37  IBW: 50 kg  %IBW: 76 %      Weight History / % Weight Change: Records indicate pt is down 37 lbs over the past 4 years.  28%         "     Estimated Nutritional Needs:  Method for Estimating Needs: 6044-0203  MSJ    Method for Estimating Needs: 50-60  1-1.2 gm kg of IBW    Method for Estimating Needs: 6408-2027 as medically indicated   20-30 ml kg of IBW    Nutrition Focused Physical Findings:   Orbital Fat Pads:  (not appropriate)                 Pain Score: 0 - No pain     Nutrition Diagnosis   Patient has Malnutrition Diagnosis: No  Diagnosis Status: New    Patient has Nutrition Diagnosis: Yes  Resolved  Nutrition Diagnosis 1: Inadequate oral intake  Related to (1): decreased ability to consume sufficient energy  As Evidenced by (1): Pt was eating poor before admit and cannot separate from bipap to eat now.       Swallowing difficulity  Diagnosis Status (2): New  Related to (2): motor causes  As Evidenced by (2): pt passed her swallow eval and was started on a dysphagia diet today          Nutrition Interventions/Recommendations   Interventions        Individualized Nutrition Prescription Provided for : Continue diet texture per pt need,  sending ensure plus high protein X 2 and magic cup X 2 to help meet nutritoinal needs    Nutrition Monitoring and Evaluation   Biochemical Data, Medical Tests and Procedures  Monitoring and Evaluation Plan: Electrolyte/renal panel, Glucose/endocrine profile  Food/Nutrient Related History Monitoring  Monitoring and Evaluation Plan: Energy intake, Fluid intake, Amount of food    Progress towards goals: Partially Met    Education Documentation  No documentation found.      Time Spent (min): 30 minutes  Last Date of Nutrition Visit: 01/18/24  Nutrition Follow-Up Needed?: 3-5 days  Follow up Comment: 1/23    ALLISON

## 2024-01-18 NOTE — PROGRESS NOTES
"Speech-Language Pathology                 Therapy Communication Note    Patient Name: Fidelina Milton  MRN: [id number]  Today's Date: 1/18/2024     Discipline: Speech Language Pathology    Missed Visit Reason: Patient ill and remains unable to transition off of BiPap long enough to safely perform formal clinical swallow evaluation or successfully consume oral diet without significant desaturations. Appreciate dietician note: Pt was eating very poor before admit.  She is 76% of her IBW.   She is on continuous bipap and can barely separate from it.  She just gets sips of water and her meds when she does separete from it.  She is not really taking anything PO.  Family is considering hospice.\"    ST to follow-up x1 additional session to determine if pt's respiratory status has improved to a level where a formal swallow evaluation can safely be performed with pt maintaining adequate respiratory levels on hi-flow or AirVo. MICHELLE (Yara) verbalized agreement/understanding.     Missed Time: Attempt             "

## 2024-01-18 NOTE — PROGRESS NOTES
Speech-Language Pathology    Inpatient Speech-Language Pathology Clinical Swallow Evaluation    Patient Name: Fidelina Milton  MRN: [id number]  Today's Date: 1/18/2024   Time Calculation  Start Time: 1210  Stop Time: 1225  Time Calculation (min): 15 min     Current Problem:   1. Shortness of breath        2. COPD exacerbation (CMS/HCC)        3. Pneumonia due to infectious organism, unspecified laterality, unspecified part of lung        4. Acute respiratory failure with hypercapnia (CMS/HCC)  Transthoracic Echo (TTE) Complete    Transthoracic Echo (TTE) Complete        Recommendations:  Risk for Aspiration: Yes (due to heightened O2 demands and weakness)  Additional Recommendations: Dysphagia treatment  Solid Diet Recommendations : Soft & bite sized/chopped (IDDSI Level 6) (No mixed consistencies)  Liquid Diet Recommendations: Thin (IDDSI Level 0)  Compensatory Swallowing Strategies: Upright 90 degrees as possible for all oral intake, One to one assist with meals, Alternate solids and liquids, Single sips, Small bites/sips  Medication Administration Recommendations: Whole, Crushed, With Pureed    Dysphagia Goals:   Patient will tolerate recommended diet without observed clinical signs of aspiration  Pt/family education     Assessment:  Assessment Results: Patient presents with mild oral dysphagia, however pharyngeal phase of swallow appears functional given strict adherence to swallow controls. Question adequate oral intake however due to frequent requirement of BiPap placement.   Medical Staff Made Aware: Yes    Plan:  Inpatient/Swing Bed or Outpatient: Inpatient  Treatment/Interventions: Diet recommendations, Assess diet tolerance, Bolus trials  SLP Plan: Skilled SLP  SLP Frequency: One time visit  SLP Discharge Recommendations:  (will continue to assess while in-house)  Diet Recommendations: Solid, Liquid  Solid Consistency: Soft & bite sized/chopped (IDDSI Level 6) (No mixed consistencies)  Liquid Consistency:  "Thin (IDDSI Level 0)  Discussed POC: Patient, Nursing, Physician  Discussed Risks/Benefits: Yes  Patient/Caregiver Agreeable: Yes    Subjective   Current Problem:    Clinical swallow evaluation ordered to rule out aspiration.     MICHELLE Bloom) reports once pt was transitioned to AirVo, pt consumed soft solids and straw sips of thin liquid from breakfast tray without incident. Pt swallowed medications crushed in puree carrier well.     General Visit Information:  Patient Class: Inpatient  Reason for Referral: rule out aspiration  Referred By: Dr. Adams    Past Medical History Relevant to Rehab: Per H&P: \"Patient is a 83 y.o. female w. PMHx of: Hypothyroidism, asthma, hypertension, GERD, MGUS, chronic anemia, hyperlipidemia, history of COPD on BiPAP presenting to [facility] from home due to hypoxemia and somnolence with difficulty of breathing.  History was obtained from family at bedside family had noticed that patient had been hypoxemic and not tolerating her BiPAP as much at home which prompted them to come to the emergency department for further evaluation.\"    Patient Seen During This Visit: Yes  Missed Time Reason: Patient ill (Comment)  Prior to Session Communication: Bedside nurse (MARTINEZ (Stephine))  [employer] Diet: regular/thin liquids  Current Diet : Regular Diet/Thin Liquids  Dysphagia Diagnosis:  (Mild oral dysphagia, which is negatively impacted by incomplete natural dentition and increased O2 demands/high AirVo levels)    Vital Signs: Upon arrival to room, there was no SPO2 monitoring hooked up. Once pulse ox placed on pt, SPO2 was only at 40%; MICHELLE Bloom) and RRT immediately notified. Instructions provided to NSG increase SPO2 to 70 liters. SPO2 returned to 97%.     Objective     Baseline Assessment:  Respiratory Status:  (AirVo)  Patient Positioning: Upright in Bed    Pain:  Pain Assessment: 0-10  Pain Score: 0 - No pain     Oral/Motor Assessment:  Dentition: Some Missing Teeth  Oral Motor: Within " Functional Limits    Consistencies Trialed:  Consistencies Trialed: Yes  Consistencies Trialed: Thin (IDDSI Level 0) - Straw, Nectar thick/mildly thick (IDDSI Level 2) - Straw, Thin (IDDSI Level 0) - Cup, Nectar thick/mildly thick (IDDSI Level 2) - Cup, Pureed/extremely thick (IDDSI Level 4), Regular (IDDSI Level 7), Soft & bite sized/chopped (IDDSI Level 6)    Clinical Observations:  Patient Positioning: Upright in Bed  Was The 3 oz Swallow Protocol Completed: Yes    Pt initially anxious and c/o difficulty breathing upon arrival to room. Once NSG increased AirVo to 70 liters, SPO2 returned to 97%. Pt is generally weak, therefore is currently a full feed. Pt with incomplete natural dentition, however ROM/coordination of oral structures intact. Given increased O2 demands, the work of mastication of hard solids is effortful with frequent respiratory breaks required. More timely and efficient mastication skills noted on soft solids, with no respiratory breaks required. Full oral containment and clearance spontaneous achieved. Pharyngeal phase of swallow appears intact. Pt consumed 3oz of thin liquid via large consecutive straw sips of thin liquid without overt s/s of aspiration and vocal quality remained clear. Pt denies burning or globus sensation in pharynx post p.o. trials. Pt did verbalize preference to receive softer solids at baseline, even when at baseline respiratory status. Given current heightened O2 demands, would recommend AVOID MIXED CONSISTENCIES, therefore pass pills in puree carrier.     Inpatient:  Education Comments  Results/recommendations discussed with pt, pt's NSG (Luis), RRT, and covering physician (Melissa).     Consultations/Referrals/Coordination of Services: Dietician; oral supplements noted in note from yesterday, but no supplements noted on pt's lunch tray this date.

## 2024-01-18 NOTE — PROGRESS NOTES
Met with family at bedside to clarify questions about LTCH.  Patient's daughter requested LTCH list for other providers; list given to daughter.  Care Transitions will continue to follow.  Julia Braun RN BSN, ED-[facility]

## 2024-01-18 NOTE — CARE PLAN
Problem: Skin  Goal: Decreased wound size/increased tissue granulation at next dressing change  Outcome: Progressing  Flowsheets (Taken 1/17/2024 1951)  Decreased wound size/increased tissue granulation at next dressing change:   Utilize specialty bed per algorithm   Protective dressings over bony prominences  Goal: Participates in plan/prevention/treatment measures  Outcome: Progressing  Flowsheets (Taken 1/17/2024 1951)  Participates in plan/prevention/treatment measures: Elevate heels  Goal: Prevent/manage excess moisture  Outcome: Progressing  Flowsheets (Taken 1/17/2024 1951)  Prevent/manage excess moisture:   Moisturize dry skin   Cleanse incontinence/protect with barrier cream  Goal: Prevent/minimize sheer/friction injuries  Outcome: Progressing  Flowsheets (Taken 1/17/2024 1951)  Prevent/minimize sheer/friction injuries:   Turn/reposition every 2 hours/use positioning/transfer devices   Use pull sheet  Goal: Promote/optimize nutrition  Outcome: Progressing  Flowsheets (Taken 1/17/2024 1951)  Promote/optimize nutrition: Assist with feeding  Goal: Promote skin healing  Outcome: Progressing  Flowsheets (Taken 1/17/2024 1951)  Promote skin healing:   Turn/reposition every 2 hours/use positioning/transfer devices   Assess skin/pad under line(s)/device(s)

## 2024-01-18 NOTE — CONSULTS
Assessment and Plan  Patient is 83 y.o. female  with the following medical Problems:  COVID-19 pneumonia  Bilateral acquired pneumonia likely bacterial.  Acute on chronic hypoxic & hypercapnic respiratory failure requiring BiPAP  Severe pulmonary hypertension based on echocardiography dated February 28, 2019  Interstitial lung disease  COPD with acute exacerbation  Bronchiectasis    Plan of Care:  Continue with supplemental oxygen to keep sat 88-94  Will attempt heated high flow oxygen.  BiPAP while sleeping, napping, and as needed.  Continue with meropenem. Vancomycin was stopped as MRSA swab was negative  S/p remdesivir but C//W  Decadron for COVID-19 infection  DVT prophylaxis.  Patient is currently on Eliquis 2.5 mg twice daily for atrial fibrillation which covers for DVT prophylaxis.  Was of care were discussed with the family on admission.  Patient is DNR CCA  Repeated chest x-ray on January 16, 2024 showed worsening infiltrate.  Speech therapy evaluation      History of Present Illness:   History is mostly obtained from the chart as patient is unable to provide history.  Patient is a 83 y.o. female with Hypothyroidism, asthma, hypertension, GERD, MGUS, chronic anemia, hyperlipidemia, history of COPD on BiPAP presenting to [facility] from home due to hypoxemia and somnolence with difficulty of breathing.  History was obtained from family at bedside family had noticed that patient had been hypoxemic and not tolerating her BiPAP as much at home which prompted them to come to the emergency department for further evaluation.  Patient is a rather poor historian and unable to provide me with any meaningful information due to her current mental state.  Given concern for hypoxic respiratory failure, CT was consulted however at this time patient's CODE STATUS has been changed to DNR CCA and DO NOT INTUBATE.  Per chart review, patient has advanced COPD and has been on noninvasive ventilation for about 4 years with hypoxemic  respiratory failure on 3 L nasal cannula throughout the day and BiPAP at night.  Per family, they are having trouble oxygenating the patient for the last few days and noted her O2 saturation has been dropping to the 60s and has been getting much more fatigued and lethargic and confused which prompted her not to be eating or drinking as much.  While patient was in the emergency department, venous blood gas showed pCO2 of 123 with pH 7.33.  Chest x-ray shows consolidation consistent with pneumonia and patient tested positive for COVID-19 today.  There is also noted that patient tested positive for COVID-19 back in November 2023.  Currently patient is on BiPAP 15/5 with 40% FiO2 saturating in the 90s .    Daily progress:  January 15, 2024: Patient is a chronic retainer with a CO2 in the 100s.  She was placed on BiPAP 4 hours on, 4 hours off overnight.  She is awake and interactive today however repeated blood gases showed a pH of 7.35 and pCO2 of 125.  She remains on supplemental oxygen.  Continues to be on remdesivir, Decadron, and meropenem.  Vancomycin was stopped as MRSA swab was negative.    January 16, 2024: Patient continues to require BiPAP intermittently alternating with supplemental oxygen.  She was awake and arousable this morning but falls asleep easily.  ABGs showed acute on chronic hypoxic and hypercapnic respiratory failure.  Patient remains on Eliquis and was started on beta-blockers.  Responds to questions appropriately when asked.    January 17, 2024: Patient's clinical continues to be on supplemental oxygen alternating with BiPAP.  She has no fever, chills, rigors.  She continues to desaturate with 15 L high flow oxygen.  She is declining quickly.  Patient's oxygen requirement has increased significantly and she is becoming critically ill.  Patient is currently DNR/DNI.    January 18, 2024: Patient remains on heated high flow oxygen.  He is awake and communicating today however she continues to be  weak and debilitated.  She is still awaiting speech therapy evaluation.  She falls easily asleep however she denies chest pain, fever, chills, rigors.    Past Medical/Surgical History:   Past Medical History:   Diagnosis Date    Diaphragmatic hernia without obstruction or gangrene 10/27/2019    Hiatal hernia    Diverticulosis of intestine, part unspecified, without perforation or abscess without bleeding 08/16/2016    Diverticulosis    Personal history of diseases of the blood and blood-forming organs and certain disorders involving the immune mechanism     History of anemia    Personal history of other diseases of the circulatory system     History of hypertension    Personal history of other diseases of the digestive system     History of gastroesophageal reflux (GERD)    Personal history of other diseases of the nervous system and sense organs     History of hearing loss    Personal history of other diseases of the respiratory system     History of asthma    Personal history of other diseases of the respiratory system     History of chronic obstructive lung disease    Personal history of other diseases of the respiratory system     History of bronchitis    Personal history of other diseases of urinary system     H/O bladder problems    Personal history of other endocrine, nutritional and metabolic disease     History of hypothyroidism    Personal history of other endocrine, nutritional and metabolic disease     History of hyperlipidemia    Personal history of other endocrine, nutritional and metabolic disease     History of thyroid disorder    Personal history of other specified conditions     History of heartburn    Personal history of pneumonia (recurrent)     History of pneumonia    Shortness of breath     SOB (shortness of breath) on exertion    Unspecified urinary incontinence     Incontinence     Past Surgical History:   Procedure Laterality Date    OTHER SURGICAL HISTORY  06/28/2019    Tonsillectomy with  adenoidectomy    TONSILLECTOMY  05/12/2016    Tonsillectomy       Family History:   No relevant family history has been documented for this patient.    Allergies:     Allergies   Allergen Reactions    Bactrim [Sulfamethoxazole-Trimethoprim] GI Upset    Cefadroxil GI Upset    Clarithromycin GI Upset    Codeine GI Upset    Erythromycin GI Upset         Social history:   reports that she does not drink alcohol and does not use drugs.    Current Medications:   No recently discontinued medications to reconcile    Review of Systems:   General: denies weight gain, denies loss of appetite, fever, chills, night sweats.  HEENT: denies headaches, dizziness, head trauma, visual changes, eye pain, tinnitus, nosebleeds, hoarseness or throat pain    Respiratory: denies chest pain, +ve dyspnea, cough but no hemoptysis  Cardiovascular: denies orthopnea, paroxysmal nocturnal dyspnea, leg swelling, and previous heart attack.    Gastrointestinal: denies pain, nausea vomiting, diarrhea, constipation, melena or bleeding.  Genitourinary: denies hematuria, frequency, urgency or dysuria  Neurology: denies syncope, seizures, paralysis, paraesthesia   Endocrine: denies polyuria, polydipsia, skin or hair changes, and heat or cold intolerance  Musculoskeletal: denies joint pain, swelling, arthritis or myalgia  Hematologic: denies bleeding, adenopathy and easy bruising  Skin: denies rashes and skin discoloration  Psychiatry: denies depression    Physical Exam:   Vital Signs:   Vitals:    01/18/24 0842   BP:    Pulse:    Resp:    Temp:    SpO2: 92%       Input/Output:    Intake/Output Summary (Last 24 hours) at 1/18/2024 1127  Last data filed at 1/18/2024 0024  Gross per 24 hour   Intake 200 ml   Output --   Net 200 ml         Oxygen requirements:    Ventilator Information:  FiO2 (%):  [40 %-100 %] 100 %  S RR:  [18] 18          General appearance: ill looking, lethargic, Not in pain or distress, in no respiratory distress    HEENT:  Atraumatic/normocephalic, EOMI, LEXI, pharynx clear, moist mucosa, redness of the uvula appreciated,   Neck: Supple, no jugular venous distension, lymphadenopathy, thyromegaly or carotid bruits  Chest: Severely diminised breath sounds, no wheezing, no crackles and no tenderness over ribs   Cardiovascular: Normal S1, S2, regular rate and rhythm, no murmur, rub or gallop  Abdomen: Normal sounds present, soft, lax with no tenderness, no hepatosplenomegaly, and no masses  Extremities: No edema. Pulses are equally present.   Skin: intact, no rashes   Neurologic: Alert and oriented x 1-2, No focal deficit     Investigations:  Labs, radiological imaging and cardiac work up were personally reviewed        ICU STAFF PHYSICIAN NOTE OF PERSONAL INVOLVEMENT IN CARE  As the attending physician, I certify that I personally reviewed the patient's history and personally examined the patient to confirm the physical findings described above, and that I reviewed the relevant imaging studies and available reports.  I also discussed the differential diagnosis and all of the proposed management plans with the patient and individuals accompanying the patient to this visit.  They had the opportunity to ask questions about the proposed management plans and to have those questions answered.    This patient has a high probability of sudden, clinically significant deterioration, which requires the highest level of physician preparedness to intervene urgently.  I managed/supervised life or organ supporting interventions that required frequent physician assessment.  I devoted my full attention to the direct care of this patient for the amount of time indicated below.  Time I spent with family or surrogate(s) is included only if the patient was incapable of providing the necessary information or participating in medical decision-making.  Time devoted to teaching and to any procedures I billed separately is not included.  Critical Care Time: 31  minutes

## 2024-01-18 NOTE — PROGRESS NOTES
Fidelina Milton is a 83 y.o. female on day 5 of admission presenting with Shortness of breath.      Subjective   Patient is a 83 year old female on day 3 of admission presenting with shortness of breath. Patient has a history of  Hypothyroidism, asthma, hypertension, GERD, MGUS, chronic anemia, hyperlipidemia, history of COPD on BiPAP.       Objective     Last Recorded Vitals  /60   Pulse 80   Temp 35.4 °C (95.7 °F)   Resp 18   Wt (!) 43.1 kg (95 lb 0.3 oz)   SpO2 92%   Intake/Output last 3 Shifts:    Intake/Output Summary (Last 24 hours) at 1/18/2024 1436  Last data filed at 1/18/2024 1259  Gross per 24 hour   Intake 300 ml   Output --   Net 300 ml         Admission Weight  Weight: (!) 43.1 kg (95 lb) (01/13/24 1431)    Daily Weight  01/17/24 : (!) 43.1 kg (95 lb 0.3 oz)    Image Results  XR chest 1 view  Narrative: Interpreted By:  Bree Gee,   STUDY:  XR CHEST 1 VIEW;  1/16/2024 1:37 pm      INDICATION:  Signs/Symptoms:COVID.      COMPARISON:  01/13/2024; 04/07/2009      ACCESSION NUMBER(S):  [id number]      ORDERING CLINICIAN:  MICHEL BERUMEN      TECHNIQUE:  2 portable images of the chest were obtained.      FINDINGS:  The patient is markedly rotated. The chin is superimposed on the  right apex. Heart appears normal in size.      There is bilateral parenchymal infiltration. There is no obvious  pleural fluid.      Bones appear osteoporotic.      COMPARISON OF FINDING:  The chest is similar. In 2009 there was bilateral upper lobe scarring.      Impression: Limited exam. Bilateral parenchymal infiltration.      MACRO:  none      Signed by: Bree Gee 1/16/2024 2:39 PM  Dictation workstation:   [id number]  ECG 12 lead  Atrial fibrillation with rapid ventricular response with premature ventricular or aberrantly conducted complexes  Lateral infarct (cited on or before 15-ISRAEL-2024)  Abnormal ECG  When compared with ECG of 15-ISRAEL-2024 16:56, (unconfirmed)  Serial changes of evolving Lateral  infarct Present  Confirmed by Rafa Mcintosh ([id number]) on 1/16/2024 10:49:25 AM      Physical Exam  Constitutional:       General: She is awake.      Appearance: Normal appearance.   Cardiovascular:      Rate and Rhythm: Normal rate and regular rhythm.      Heart sounds: Normal heart sounds.   Pulmonary:      Effort: Pulmonary effort is normal.      Breath sounds: Normal breath sounds and air entry.   Abdominal:      General: Bowel sounds are normal.      Palpations: Abdomen is soft.   Neurological:      General: No focal deficit present.      Mental Status: She is alert and oriented to person, place, and time.   Psychiatric:         Attention and Perception: Attention normal.         Mood and Affect: Mood and affect normal.         Speech: Speech normal.         Behavior: Behavior normal.         Relevant Results      Scheduled medications  apixaban, 2.5 mg, oral, q12h  dexAMETHasone, 6 mg, intravenous, q12h  [Held by provider] furosemide, 80 mg, oral, Daily  ipratropium-albuteroL, 3 mL, nebulization, q4h  levothyroxine, 50 mcg, oral, Daily  meropenem, 1 g, intravenous, q12h  metoprolol tartrate, 25 mg, oral, BID  pantoprazole, 40 mg, oral, Daily before breakfast   Or  pantoprazole, 40 mg, intravenous, Daily before breakfast      Continuous medications  D5 % and 0.9 % sodium chloride, 60 mL/hr, Last Rate: 60 mL/hr (01/18/24 1228)      PRN medications  PRN medications: acetaminophen **OR** acetaminophen **OR** acetaminophen, ammonium lactate, guaiFENesin, hydrALAZINE, ipratropium-albuteroL, oxygen, oxygen, polyethylene glycol  No results found for this or any previous visit (from the past 24 hour(s)).             Assessment/Plan   This patient currently has cardiac telemetry ordered; if you would like to modify or discontinue the telemetry order, click here to go to the orders activity to modify/discontinue the order.          Principal Problem:    Shortness of breath    Patient was fully evaluated on January 16, 2024.  ABG and ECHO needed. Will recheck labs in AM.  Patient with A-fib with RVR converted to sinus rhythm with Cardizem.  Clinically improved.  Echocardiogram ordered.    Patient was fully evaluated on January 17, 2024. Patient is on BIPAP FIO2 40%. Will recheck labs in AM.     Patient fully evaluated on January 18, 2024. Patient alternates between BIPAP and airvo. Will recheck labs in AM.               Malika Otero

## 2024-01-19 ENCOUNTER — APPOINTMENT (OUTPATIENT)
Dept: RADIOLOGY | Facility: HOSPITAL | Age: 84
DRG: 871 | End: 2024-01-19
Payer: MEDICARE

## 2024-01-19 LAB
ANION GAP BLDA CALCULATED.4IONS-SCNC: -4 MMO/L (ref 10–25)
ARTERIAL PATENCY WRIST A: POSITIVE
BASE EXCESS BLDA CALC-SCNC: 19.7 MMOL/L (ref -2–3)
BODY TEMPERATURE: 37 DEGREES CELSIUS
CA-I BLDA-SCNC: 1.07 MMOL/L (ref 1.1–1.33)
CHLORIDE BLDA-SCNC: 100 MMOL/L (ref 98–107)
CRITICAL CALL TIME: 1440
CRITICAL CALLED BY: ABNORMAL
CRITICAL CALLED TO: ABNORMAL
CRITICAL READ BACK: ABNORMAL
EPAP CMH2O: 5 CM H2O
FREQUENCY (BPM): 18 BPM
GLUCOSE BLDA-MCNC: 186 MG/DL (ref 74–99)
HCO3 BLDA-SCNC: 52.3 MMOL/L (ref 22–26)
HCT VFR BLD EST: 34 % (ref 36–46)
HGB BLDA-MCNC: 11.4 G/DL (ref 12–16)
INHALED O2 CONCENTRATION: 45 %
INSPIRATORY TIME: 1
IPAP CMH2O: 15 CM H2O
LACTATE BLDA-SCNC: 1.4 MMOL/L (ref 0.4–2)
OXYHGB MFR BLDA: 95 % (ref 94–98)
PCO2 BLDA: 125 MM HG (ref 38–42)
PEAK PRESSURE: 15 CM H2O
PH BLDA: 7.23 PH (ref 7.38–7.42)
PO2 BLDA: 86 MM HG (ref 85–95)
POTASSIUM BLDA-SCNC: 3.8 MMOL/L (ref 3.5–5.3)
SAO2 % BLDA: 98 % (ref 94–100)
SODIUM BLDA-SCNC: 145 MMOL/L (ref 136–145)
SPECIMEN DRAWN FROM PATIENT: ABNORMAL
SPONTANEOUS TIDAL VOLUME: 830 ML
TOTAL MINUTE VOLUME: 15 LITER
VENTILATOR MODE: ABNORMAL
VENTILATOR RATE: 18 BPM

## 2024-01-19 PROCEDURE — 71045 X-RAY EXAM CHEST 1 VIEW: CPT | Performed by: RADIOLOGY

## 2024-01-19 PROCEDURE — 2500000004 HC RX 250 GENERAL PHARMACY W/ HCPCS (ALT 636 FOR OP/ED): Performed by: INTERNAL MEDICINE

## 2024-01-19 PROCEDURE — 71045 X-RAY EXAM CHEST 1 VIEW: CPT

## 2024-01-19 PROCEDURE — 2500000001 HC RX 250 WO HCPCS SELF ADMINISTERED DRUGS (ALT 637 FOR MEDICARE OP): Performed by: INTERNAL MEDICINE

## 2024-01-19 PROCEDURE — 2060000001 HC INTERMEDIATE ICU ROOM DAILY

## 2024-01-19 PROCEDURE — 2500000002 HC RX 250 W HCPCS SELF ADMINISTERED DRUGS (ALT 637 FOR MEDICARE OP, ALT 636 FOR OP/ED): Performed by: STUDENT IN AN ORGANIZED HEALTH CARE EDUCATION/TRAINING PROGRAM

## 2024-01-19 PROCEDURE — 36600 WITHDRAWAL OF ARTERIAL BLOOD: CPT

## 2024-01-19 PROCEDURE — 94660 CPAP INITIATION&MGMT: CPT

## 2024-01-19 PROCEDURE — 2500000004 HC RX 250 GENERAL PHARMACY W/ HCPCS (ALT 636 FOR OP/ED)

## 2024-01-19 PROCEDURE — 84132 ASSAY OF SERUM POTASSIUM: CPT | Performed by: INTERNAL MEDICINE

## 2024-01-19 PROCEDURE — 94640 AIRWAY INHALATION TREATMENT: CPT

## 2024-01-19 RX ADMIN — MEROPENEM 1 G: 1 INJECTION, POWDER, FOR SOLUTION INTRAVENOUS at 20:48

## 2024-01-19 RX ADMIN — IPRATROPIUM BROMIDE AND ALBUTEROL SULFATE 3 ML: .5; 3 SOLUTION RESPIRATORY (INHALATION) at 20:25

## 2024-01-19 RX ADMIN — IPRATROPIUM BROMIDE AND ALBUTEROL SULFATE 3 ML: .5; 3 SOLUTION RESPIRATORY (INHALATION) at 23:17

## 2024-01-19 RX ADMIN — IPRATROPIUM BROMIDE AND ALBUTEROL SULFATE 3 ML: .5; 3 SOLUTION RESPIRATORY (INHALATION) at 15:00

## 2024-01-19 RX ADMIN — LEVOTHYROXINE SODIUM 50 MCG: 50 TABLET ORAL at 05:47

## 2024-01-19 RX ADMIN — IPRATROPIUM BROMIDE AND ALBUTEROL SULFATE 3 ML: .5; 3 SOLUTION RESPIRATORY (INHALATION) at 02:22

## 2024-01-19 RX ADMIN — DEXAMETHASONE SODIUM PHOSPHATE 6 MG: 10 INJECTION INTRAMUSCULAR; INTRAVENOUS at 20:47

## 2024-01-19 RX ADMIN — DEXAMETHASONE SODIUM PHOSPHATE 6 MG: 10 INJECTION INTRAMUSCULAR; INTRAVENOUS at 09:21

## 2024-01-19 RX ADMIN — IPRATROPIUM BROMIDE AND ALBUTEROL SULFATE 3 ML: .5; 3 SOLUTION RESPIRATORY (INHALATION) at 07:40

## 2024-01-19 RX ADMIN — DEXTROSE AND SODIUM CHLORIDE 60 ML/HR: 5; 900 INJECTION, SOLUTION INTRAVENOUS at 05:47

## 2024-01-19 RX ADMIN — IPRATROPIUM BROMIDE AND ALBUTEROL SULFATE 3 ML: .5; 3 SOLUTION RESPIRATORY (INHALATION) at 11:36

## 2024-01-19 RX ADMIN — MEROPENEM 1 G: 1 INJECTION, POWDER, FOR SOLUTION INTRAVENOUS at 09:23

## 2024-01-19 RX ADMIN — APIXABAN 2.5 MG: 2.5 TABLET, FILM COATED ORAL at 09:20

## 2024-01-19 RX ADMIN — METOPROLOL TARTRATE 25 MG: 25 TABLET, FILM COATED ORAL at 09:20

## 2024-01-19 RX ADMIN — PANTOPRAZOLE SODIUM 40 MG: 40 TABLET, DELAYED RELEASE ORAL at 09:20

## 2024-01-19 ASSESSMENT — COGNITIVE AND FUNCTIONAL STATUS - GENERAL
MOVING TO AND FROM BED TO CHAIR: TOTAL
HELP NEEDED FOR BATHING: TOTAL
DAILY ACTIVITIY SCORE: 8
TOILETING: TOTAL
TURNING FROM BACK TO SIDE WHILE IN FLAT BAD: A LOT
CLIMB 3 TO 5 STEPS WITH RAILING: TOTAL
PERSONAL GROOMING: A LOT
EATING MEALS: A LOT
DRESSING REGULAR UPPER BODY CLOTHING: TOTAL
DRESSING REGULAR LOWER BODY CLOTHING: TOTAL
STANDING UP FROM CHAIR USING ARMS: TOTAL
WALKING IN HOSPITAL ROOM: TOTAL
MOVING FROM LYING ON BACK TO SITTING ON SIDE OF FLAT BED WITH BEDRAILS: A LOT
MOBILITY SCORE: 8

## 2024-01-19 ASSESSMENT — PAIN SCALES - GENERAL
PAINLEVEL_OUTOF10: 0 - NO PAIN
PAINLEVEL_OUTOF10: 0 - NO PAIN

## 2024-01-19 NOTE — NURSING NOTE
and  - made aware that pt has not had any food all day. Pt has been on bipap all day.  ABG's reported to  and dr. Young .  Both physicians aware that pt's daughter looking for an update regarding pt's plan of care.

## 2024-01-19 NOTE — PROGRESS NOTES
Per [facility], family to tour [facility]. Awaiting final choice. LTACH list given to daughter, per previous TCC. Will follow up with daughter. TCC to continue to follow for discharge planning.     UPDATE 1339: Call placed to daughter, Radha for follow up on [facility]ACH choice and follow up on [facility] tour. No answer. VM left awaiting call back.     FRANKY NAJERA RN TCC

## 2024-01-19 NOTE — CARE PLAN
The patient's goals for the shift include  rest    The clinical goals for the shift include decreased 02 demands

## 2024-01-19 NOTE — PROGRESS NOTES
Fidelina Milton is a 83 y.o. female on day 6 of admission presenting with Shortness of breath.      Subjective   Patient is a 83 year old female on day 3 of admission presenting with shortness of breath. Patient has a history of  Hypothyroidism, asthma, hypertension, GERD, MGUS, chronic anemia, hyperlipidemia, history of COPD on BiPAP.       Objective     Last Recorded Vitals  /61   Pulse 78   Temp 36 °C (96.8 °F)   Resp 20   Wt (!) 43.1 kg (95 lb 0.3 oz)   SpO2 93%   Intake/Output last 3 Shifts:    Intake/Output Summary (Last 24 hours) at 1/19/2024 1326  Last data filed at 1/19/2024 0924  Gross per 24 hour   Intake 200 ml   Output --   Net 200 ml         Admission Weight  Weight: (!) 43.1 kg (95 lb) (01/13/24 1431)    Daily Weight  01/18/24 : (!) 43.1 kg (95 lb 0.3 oz)    Image Results  XR chest 1 view  Narrative: Interpreted By:  Bree Gee,   STUDY:  XR CHEST 1 VIEW;  1/16/2024 1:37 pm      INDICATION:  Signs/Symptoms:COVID.      COMPARISON:  01/13/2024; 04/07/2009      ACCESSION NUMBER(S):  [id number]      ORDERING CLINICIAN:  MICHEL BERUMEN      TECHNIQUE:  2 portable images of the chest were obtained.      FINDINGS:  The patient is markedly rotated. The chin is superimposed on the  right apex. Heart appears normal in size.      There is bilateral parenchymal infiltration. There is no obvious  pleural fluid.      Bones appear osteoporotic.      COMPARISON OF FINDING:  The chest is similar. In 2009 there was bilateral upper lobe scarring.      Impression: Limited exam. Bilateral parenchymal infiltration.      MACRO:  none      Signed by: Bree Gee 1/16/2024 2:39 PM  Dictation workstation:   [id number]  ECG 12 lead  Atrial fibrillation with rapid ventricular response with premature ventricular or aberrantly conducted complexes  Lateral infarct (cited on or before 15-ISRAEL-2024)  Abnormal ECG  When compared with ECG of 15-ISRAEL-2024 16:56, (unconfirmed)  Serial changes of evolving Lateral infarct  Present  Confirmed by Rafa Mcintosh ([id number]) on 1/16/2024 10:49:25 AM      Physical Exam  Constitutional:       General: She is awake.      Appearance: Normal appearance.   Cardiovascular:      Rate and Rhythm: Normal rate and regular rhythm.      Heart sounds: Normal heart sounds.   Pulmonary:      Effort: Pulmonary effort is normal.      Breath sounds: Normal breath sounds and air entry.   Abdominal:      General: Bowel sounds are normal.      Palpations: Abdomen is soft.   Neurological:      General: No focal deficit present.      Mental Status: She is alert and oriented to person, place, and time.   Psychiatric:         Attention and Perception: Attention normal.         Mood and Affect: Mood and affect normal.         Speech: Speech normal.         Behavior: Behavior normal.         Relevant Results      Scheduled medications  apixaban, 2.5 mg, oral, q12h  dexAMETHasone, 6 mg, intravenous, q12h  [Held by provider] furosemide, 80 mg, oral, Daily  ipratropium-albuteroL, 3 mL, nebulization, q4h  levothyroxine, 50 mcg, oral, Daily  meropenem, 1 g, intravenous, q12h  metoprolol tartrate, 25 mg, oral, BID  pantoprazole, 40 mg, oral, Daily before breakfast   Or  pantoprazole, 40 mg, intravenous, Daily before breakfast  perflutren lipid microspheres, 0.5-10 mL of dilution, intravenous, Once in imaging  perflutren protein A microsphere, 0.5 mL, intravenous, Once in imaging  sulfur hexafluoride microsphr, 2 mL, intravenous, Once in imaging      Continuous medications  D5 % and 0.9 % sodium chloride, 60 mL/hr, Last Rate: 60 mL/hr (01/19/24 0547)      PRN medications  PRN medications: acetaminophen **OR** acetaminophen **OR** acetaminophen, ammonium lactate, guaiFENesin, hydrALAZINE, ipratropium-albuteroL, oxygen, oxygen, polyethylene glycol  No results found for this or any previous visit (from the past 24 hour(s)).             Assessment/Plan   This patient currently has cardiac telemetry ordered; if you would like to  modify or discontinue the telemetry order, click here to go to the orders activity to modify/discontinue the order.          Principal Problem:    Shortness of breath    Patient was fully evaluated on January 16, 2024. ABG and ECHO needed. Will recheck labs in AM.  Patient with A-fib with RVR converted to sinus rhythm with Cardizem.  Clinically improved.  Echocardiogram ordered.    Patient was fully evaluated on January 17, 2024. Patient is on BIPAP FIO2 40%. Will recheck labs in AM.     Patient fully evaluated on January 18, 2024. Patient alternates between BIPAP and airvo. Will recheck labs in AM.     Patient fully evaluated on January 19, 2024. Patient was saturating at 92%. Patient was noted to tachycardic. Will recheck labs in AM.               Malika Otero

## 2024-01-19 NOTE — CONSULTS
Assessment and Plan  Patient is 83 y.o. female  with the following medical Problems:  COVID-19 pneumonia  Bilateral acquired pneumonia likely bacterial.  Acute on chronic hypoxic & hypercapnic respiratory failure requiring BiPAP  Severe pulmonary hypertension based on echocardiography dated February 28, 2019  Interstitial lung disease  COPD with acute exacerbation  Bronchiectasis    Plan of Care:  Continue with supplemental oxygen to keep sat 88-94  Will attempt heated high flow oxygen.  BiPAP while sleeping, napping, and as needed.  Continue with meropenem. Vancomycin was stopped as MRSA swab was negative  S/p remdesivir but C//W  Decadron for COVID-19 infection  DVT prophylaxis.  Patient is currently on Eliquis 2.5 mg twice daily for atrial fibrillation which covers for DVT prophylaxis.  Was of care were discussed with the family on admission.  Patient is DNR CCA  Repeated chest x-ray on January 16, 2024 showed worsening infiltrate.  Speech therapy evaluation  Goals of care discussion.  ABGs today.      History of Present Illness:   History is mostly obtained from the chart as patient is unable to provide history.  Patient is a 83 y.o. female with Hypothyroidism, asthma, hypertension, GERD, MGUS, chronic anemia, hyperlipidemia, history of COPD on BiPAP presenting to [facility] from home due to hypoxemia and somnolence with difficulty of breathing.  History was obtained from family at bedside family had noticed that patient had been hypoxemic and not tolerating her BiPAP as much at home which prompted them to come to the emergency department for further evaluation.  Patient is a rather poor historian and unable to provide me with any meaningful information due to her current mental state.  Given concern for hypoxic respiratory failure, CT was consulted however at this time patient's CODE STATUS has been changed to DNR CCA and DO NOT INTUBATE.  Per chart review, patient has advanced COPD and has been on noninvasive  ventilation for about 4 years with hypoxemic respiratory failure on 3 L nasal cannula throughout the day and BiPAP at night.  Per family, they are having trouble oxygenating the patient for the last few days and noted her O2 saturation has been dropping to the 60s and has been getting much more fatigued and lethargic and confused which prompted her not to be eating or drinking as much.  While patient was in the emergency department, venous blood gas showed pCO2 of 123 with pH 7.33.  Chest x-ray shows consolidation consistent with pneumonia and patient tested positive for COVID-19 today.  There is also noted that patient tested positive for COVID-19 back in November 2023.  Currently patient is on BiPAP 15/5 with 40% FiO2 saturating in the 90s .    Daily progress:  January 15, 2024: Patient is a chronic retainer with a CO2 in the 100s.  She was placed on BiPAP 4 hours on, 4 hours off overnight.  She is awake and interactive today however repeated blood gases showed a pH of 7.35 and pCO2 of 125.  She remains on supplemental oxygen.  Continues to be on remdesivir, Decadron, and meropenem.  Vancomycin was stopped as MRSA swab was negative.    January 16, 2024: Patient continues to require BiPAP intermittently alternating with supplemental oxygen.  She was awake and arousable this morning but falls asleep easily.  ABGs showed acute on chronic hypoxic and hypercapnic respiratory failure.  Patient remains on Eliquis and was started on beta-blockers.  Responds to questions appropriately when asked.    January 17, 2024: Patient's clinical continues to be on supplemental oxygen alternating with BiPAP.  She has no fever, chills, rigors.  She continues to desaturate with 15 L high flow oxygen.  She is declining quickly.  Patient's oxygen requirement has increased significantly and she is becoming critically ill.  Patient is currently DNR/DNI.    January 18, 2024: Patient remains on heated high flow oxygen.  He is awake and  communicating today however she continues to be weak and debilitated.  She is still awaiting speech therapy evaluation.  She falls easily asleep however she denies chest pain, fever, chills, rigors.    January 19, 2024: Patient continues to be on BiPAP alternating with heated high flow oxygen.  She remains lethargic and weak.  Oral intake has been poor.  Patient has been maintained on IV fluid.  Patient remains very ill.    Past Medical/Surgical History:   Past Medical History:   Diagnosis Date    Diaphragmatic hernia without obstruction or gangrene 10/27/2019    Hiatal hernia    Diverticulosis of intestine, part unspecified, without perforation or abscess without bleeding 08/16/2016    Diverticulosis    Personal history of diseases of the blood and blood-forming organs and certain disorders involving the immune mechanism     History of anemia    Personal history of other diseases of the circulatory system     History of hypertension    Personal history of other diseases of the digestive system     History of gastroesophageal reflux (GERD)    Personal history of other diseases of the nervous system and sense organs     History of hearing loss    Personal history of other diseases of the respiratory system     History of asthma    Personal history of other diseases of the respiratory system     History of chronic obstructive lung disease    Personal history of other diseases of the respiratory system     History of bronchitis    Personal history of other diseases of urinary system     H/O bladder problems    Personal history of other endocrine, nutritional and metabolic disease     History of hypothyroidism    Personal history of other endocrine, nutritional and metabolic disease     History of hyperlipidemia    Personal history of other endocrine, nutritional and metabolic disease     History of thyroid disorder    Personal history of other specified conditions     History of heartburn    Personal history of  pneumonia (recurrent)     History of pneumonia    Shortness of breath     SOB (shortness of breath) on exertion    Unspecified urinary incontinence     Incontinence     Past Surgical History:   Procedure Laterality Date    OTHER SURGICAL HISTORY  06/28/2019    Tonsillectomy with adenoidectomy    TONSILLECTOMY  05/12/2016    Tonsillectomy       Family History:   No relevant family history has been documented for this patient.    Allergies:     Allergies   Allergen Reactions    Bactrim [Sulfamethoxazole-Trimethoprim] GI Upset    Cefadroxil GI Upset    Clarithromycin GI Upset    Codeine GI Upset    Erythromycin GI Upset         Social history:   reports that she does not drink alcohol and does not use drugs.    Current Medications:   No recently discontinued medications to reconcile    Review of Systems:   General: denies weight gain, denies loss of appetite, fever, chills, night sweats.  HEENT: denies headaches, dizziness, head trauma, visual changes, eye pain, tinnitus, nosebleeds, hoarseness or throat pain    Respiratory: denies chest pain, +ve dyspnea, cough but no hemoptysis  Cardiovascular: denies orthopnea, paroxysmal nocturnal dyspnea, leg swelling, and previous heart attack.    Gastrointestinal: denies pain, nausea vomiting, diarrhea, constipation, melena or bleeding.  Genitourinary: denies hematuria, frequency, urgency or dysuria  Neurology: denies syncope, seizures, paralysis, paraesthesia   Endocrine: denies polyuria, polydipsia, skin or hair changes, and heat or cold intolerance  Musculoskeletal: denies joint pain, swelling, arthritis or myalgia  Hematologic: denies bleeding, adenopathy and easy bruising  Skin: denies rashes and skin discoloration  Psychiatry: denies depression    Physical Exam:   Vital Signs:   Vitals:    01/19/24 1159   BP: 137/61   Pulse:    Resp:    Temp: 36 °C (96.8 °F)   SpO2: 93%       Input/Output:    Intake/Output Summary (Last 24 hours) at 1/19/2024 1340  Last data filed at  1/19/2024 0924  Gross per 24 hour   Intake 200 ml   Output --   Net 200 ml         Oxygen requirements:    Ventilator Information:  FiO2 (%):  [40 %-70 %] 45 %  S RR:  [18] 18          General appearance: ill looking, lethargic, Not in pain or distress, in no respiratory distress    HEENT: Atraumatic/normocephalic, EOMI, LEXI, pharynx clear, moist mucosa, redness of the uvula appreciated,   Neck: Supple, no jugular venous distension, lymphadenopathy, thyromegaly or carotid bruits  Chest: Severely diminised breath sounds, no wheezing, no crackles and no tenderness over ribs   Cardiovascular: Normal S1, S2, regular rate and rhythm, no murmur, rub or gallop  Abdomen: Normal sounds present, soft, lax with no tenderness, no hepatosplenomegaly, and no masses  Extremities: No edema. Pulses are equally present.   Skin: intact, no rashes   Neurologic: Alert and oriented x 1-2, No focal deficit     Investigations:  Labs, radiological imaging and cardiac work up were personally reviewed        ICU STAFF PHYSICIAN NOTE OF PERSONAL INVOLVEMENT IN CARE  As the attending physician, I certify that I personally reviewed the patient's history and personally examined the patient to confirm the physical findings described above, and that I reviewed the relevant imaging studies and available reports.  I also discussed the differential diagnosis and all of the proposed management plans with the patient and individuals accompanying the patient to this visit.  They had the opportunity to ask questions about the proposed management plans and to have those questions answered.    This patient has a high probability of sudden, clinically significant deterioration, which requires the highest level of physician preparedness to intervene urgently.  I managed/supervised life or organ supporting interventions that required frequent physician assessment.  I devoted my full attention to the direct care of this patient for the amount of time  indicated below.  Time I spent with family or surrogate(s) is included only if the patient was incapable of providing the necessary information or participating in medical decision-making.  Time devoted to teaching and to any procedures I billed separately is not included.  Critical Care Time: 31 minutes

## 2024-01-19 NOTE — CARE PLAN
The patient's goals for the shift include      The clinical goals for the shift include decreased 02 demands    Problem: Skin  Goal: Decreased wound size/increased tissue granulation at next dressing change  Outcome: Progressing  Flowsheets (Taken 1/19/2024 1057)  Decreased wound size/increased tissue granulation at next dressing change: Promote sleep for wound healing  Goal: Participates in plan/prevention/treatment measures  Outcome: Progressing  Flowsheets (Taken 1/17/2024 1951 by Kya Bui RN)  Participates in plan/prevention/treatment measures: Elevate heels  Goal: Prevent/manage excess moisture  Outcome: Progressing  Flowsheets (Taken 1/19/2024 1057)  Prevent/manage excess moisture: Moisturize dry skin  Goal: Prevent/minimize sheer/friction injuries  Outcome: Progressing  Flowsheets (Taken 1/19/2024 1057)  Prevent/minimize sheer/friction injuries: HOB 30 degrees or less  Goal: Promote/optimize nutrition  Outcome: Progressing  Flowsheets (Taken 1/19/2024 1057)  Promote/optimize nutrition: Assist with feeding  Goal: Promote skin healing  Outcome: Progressing  Flowsheets (Taken 1/19/2024 1057)  Promote skin healing: Turn/reposition every 2 hours/use positioning/transfer devices

## 2024-01-19 NOTE — CARE PLAN
Problem: Skin  Goal: Decreased wound size/increased tissue granulation at next dressing change  Outcome: Progressing  Flowsheets (Taken 1/19/2024 1057)  Decreased wound size/increased tissue granulation at next dressing change: Promote sleep for wound healing  Goal: Participates in plan/prevention/treatment measures  Outcome: Progressing  Flowsheets (Taken 1/17/2024 1951 by Kya Bui, RN)  Participates in plan/prevention/treatment measures: Elevate heels  Goal: Prevent/manage excess moisture  Outcome: Progressing  Flowsheets (Taken 1/19/2024 1057)  Prevent/manage excess moisture: Moisturize dry skin  Goal: Prevent/minimize sheer/friction injuries  Outcome: Progressing  Flowsheets (Taken 1/19/2024 1057)  Prevent/minimize sheer/friction injuries: HOB 30 degrees or less  Goal: Promote/optimize nutrition  Outcome: Progressing  Flowsheets (Taken 1/19/2024 1057)  Promote/optimize nutrition: Assist with feeding  Goal: Promote skin healing  Outcome: Progressing  Flowsheets (Taken 1/19/2024 1057)  Promote skin healing: Turn/reposition every 2 hours/use positioning/transfer devices   The patient's goals for the shift include      The clinical goals for the shift include decreased 02 demands

## 2024-01-19 NOTE — PROGRESS NOTES
Speech-Language Pathology                 Therapy Communication Note    Patient Name: Fidelina Milton  MRN: [id number]  Today's Date: 1/19/2024     Discipline: Speech Language Pathology    Missed Visit Reason:  Pt on BiPAP at this time.   Per nurse Collette pt not eating.     Missed Time: Attempt    Comment: Discharge plan [facility]      SLP to follow as pt able to participate and not displaying unstable  respiratory skills requiring continuous BIPAP

## 2024-01-20 LAB
ALBUMIN SERPL BCP-MCNC: 2.7 G/DL (ref 3.4–5)
ALP SERPL-CCNC: 53 U/L (ref 33–136)
ALT SERPL W P-5'-P-CCNC: 13 U/L (ref 7–45)
ANION GAP SERPL CALC-SCNC: ABNORMAL MMOL/L
AST SERPL W P-5'-P-CCNC: 29 U/L (ref 9–39)
BILIRUB SERPL-MCNC: 0.3 MG/DL (ref 0–1.2)
BUN SERPL-MCNC: 16 MG/DL (ref 6–23)
CALCIUM SERPL-MCNC: 7.6 MG/DL (ref 8.6–10.3)
CHLORIDE SERPL-SCNC: 100 MMOL/L (ref 98–107)
CO2 SERPL-SCNC: >45 MMOL/L (ref 21–32)
CREAT SERPL-MCNC: 0.48 MG/DL (ref 0.5–1.05)
EGFRCR SERPLBLD CKD-EPI 2021: >90 ML/MIN/1.73M*2
ERYTHROCYTE [DISTWIDTH] IN BLOOD BY AUTOMATED COUNT: 15.1 % (ref 11.5–14.5)
GLUCOSE SERPL-MCNC: 93 MG/DL (ref 74–99)
HCT VFR BLD AUTO: 37.9 % (ref 36–46)
HGB BLD-MCNC: 10.3 G/DL (ref 12–16)
MCH RBC QN AUTO: 29.4 PG (ref 26–34)
MCHC RBC AUTO-ENTMCNC: 27.2 G/DL (ref 32–36)
MCV RBC AUTO: 108 FL (ref 80–100)
NRBC BLD-RTO: 0.4 /100 WBCS (ref 0–0)
PLATELET # BLD AUTO: 117 X10*3/UL (ref 150–450)
POTASSIUM SERPL-SCNC: 3.8 MMOL/L (ref 3.5–5.3)
PROT SERPL-MCNC: 5.2 G/DL (ref 6.4–8.2)
RBC # BLD AUTO: 3.5 X10*6/UL (ref 4–5.2)
SODIUM SERPL-SCNC: 149 MMOL/L (ref 136–145)
WBC # BLD AUTO: 4.6 X10*3/UL (ref 4.4–11.3)

## 2024-01-20 PROCEDURE — 2500000004 HC RX 250 GENERAL PHARMACY W/ HCPCS (ALT 636 FOR OP/ED): Performed by: INTERNAL MEDICINE

## 2024-01-20 PROCEDURE — 80053 COMPREHEN METABOLIC PANEL: CPT | Performed by: INTERNAL MEDICINE

## 2024-01-20 PROCEDURE — 94660 CPAP INITIATION&MGMT: CPT

## 2024-01-20 PROCEDURE — 94640 AIRWAY INHALATION TREATMENT: CPT

## 2024-01-20 PROCEDURE — 2500000002 HC RX 250 W HCPCS SELF ADMINISTERED DRUGS (ALT 637 FOR MEDICARE OP, ALT 636 FOR OP/ED): Performed by: STUDENT IN AN ORGANIZED HEALTH CARE EDUCATION/TRAINING PROGRAM

## 2024-01-20 PROCEDURE — 85027 COMPLETE CBC AUTOMATED: CPT | Performed by: INTERNAL MEDICINE

## 2024-01-20 PROCEDURE — 2060000001 HC INTERMEDIATE ICU ROOM DAILY

## 2024-01-20 PROCEDURE — 2500000004 HC RX 250 GENERAL PHARMACY W/ HCPCS (ALT 636 FOR OP/ED)

## 2024-01-20 PROCEDURE — 36415 COLL VENOUS BLD VENIPUNCTURE: CPT | Performed by: INTERNAL MEDICINE

## 2024-01-20 PROCEDURE — 2500000005 HC RX 250 GENERAL PHARMACY W/O HCPCS

## 2024-01-20 PROCEDURE — C9113 INJ PANTOPRAZOLE SODIUM, VIA: HCPCS

## 2024-01-20 RX ORDER — MORPHINE SULFATE 2 MG/ML
1 INJECTION, SOLUTION INTRAMUSCULAR; INTRAVENOUS
Start: 2024-01-20 | End: 2024-01-24

## 2024-01-20 RX ORDER — ACETAMINOPHEN 325 MG/1
650 TABLET ORAL EVERY 4 HOURS PRN
Qty: 30 TABLET | Refills: 0 | Status: SHIPPED | OUTPATIENT
Start: 2024-01-20 | End: 2024-01-24

## 2024-01-20 RX ORDER — MORPHINE SULFATE 2 MG/ML
1 INJECTION, SOLUTION INTRAMUSCULAR; INTRAVENOUS
Status: DISCONTINUED | OUTPATIENT
Start: 2024-01-20 | End: 2024-01-21 | Stop reason: HOSPADM

## 2024-01-20 RX ORDER — MORPHINE SULFATE 2 MG/ML
1 INJECTION, SOLUTION INTRAMUSCULAR; INTRAVENOUS
Status: DISCONTINUED | OUTPATIENT
Start: 2024-01-20 | End: 2024-01-20

## 2024-01-20 RX ADMIN — DEXTROSE AND SODIUM CHLORIDE 60 ML/HR: 5; 900 INJECTION, SOLUTION INTRAVENOUS at 19:54

## 2024-01-20 RX ADMIN — IPRATROPIUM BROMIDE AND ALBUTEROL SULFATE 3 ML: .5; 3 SOLUTION RESPIRATORY (INHALATION) at 23:16

## 2024-01-20 RX ADMIN — MEROPENEM 1 G: 1 INJECTION, POWDER, FOR SOLUTION INTRAVENOUS at 09:27

## 2024-01-20 RX ADMIN — IPRATROPIUM BROMIDE AND ALBUTEROL SULFATE 3 ML: .5; 3 SOLUTION RESPIRATORY (INHALATION) at 19:30

## 2024-01-20 RX ADMIN — IPRATROPIUM BROMIDE AND ALBUTEROL SULFATE 3 ML: .5; 3 SOLUTION RESPIRATORY (INHALATION) at 10:53

## 2024-01-20 RX ADMIN — MEROPENEM 1 G: 1 INJECTION, POWDER, FOR SOLUTION INTRAVENOUS at 20:02

## 2024-01-20 RX ADMIN — DEXAMETHASONE SODIUM PHOSPHATE 6 MG: 10 INJECTION INTRAMUSCULAR; INTRAVENOUS at 20:02

## 2024-01-20 RX ADMIN — IPRATROPIUM BROMIDE AND ALBUTEROL SULFATE 3 ML: .5; 3 SOLUTION RESPIRATORY (INHALATION) at 03:50

## 2024-01-20 RX ADMIN — PANTOPRAZOLE SODIUM 40 MG: 40 INJECTION, POWDER, FOR SOLUTION INTRAVENOUS at 09:27

## 2024-01-20 RX ADMIN — IPRATROPIUM BROMIDE AND ALBUTEROL SULFATE 3 ML: .5; 3 SOLUTION RESPIRATORY (INHALATION) at 14:11

## 2024-01-20 RX ADMIN — IPRATROPIUM BROMIDE AND ALBUTEROL SULFATE 3 ML: .5; 3 SOLUTION RESPIRATORY (INHALATION) at 07:10

## 2024-01-20 RX ADMIN — MORPHINE SULFATE 1 MG: 2 INJECTION, SOLUTION INTRAMUSCULAR; INTRAVENOUS at 18:14

## 2024-01-20 RX ADMIN — DEXAMETHASONE SODIUM PHOSPHATE 6 MG: 10 INJECTION INTRAMUSCULAR; INTRAVENOUS at 09:27

## 2024-01-20 RX ADMIN — Medication 50 PERCENT: at 07:23

## 2024-01-20 ASSESSMENT — PAIN SCALES - WONG BAKER: WONGBAKER_NUMERICALRESPONSE: NO HURT

## 2024-01-20 ASSESSMENT — PAIN SCALES - GENERAL: PAINLEVEL_OUTOF10: 0 - NO PAIN

## 2024-01-20 NOTE — DISCHARGE SUMMARY
Discharge Diagnosis  Shortness of breath    Issues Requiring Follow-Up  Patient fully evaluated on January 20 and still significant short of breath.    Discharge Meds     Your medication list        START taking these medications        Instructions Last Dose Given Next Dose Due   acetaminophen 325 mg tablet  Commonly known as: Tylenol      Take 2 tablets (650 mg) by mouth every 4 hours if needed for mild pain (1 - 3).       morphine 2 mg/mL injection      Infuse 0.5 mL (1 mg) into a venous catheter every 2 hours.       oxygen gas therapy  Commonly known as: O2      Inhale 5 L/min once every 24 hours.              CONTINUE taking these medications        Instructions Last Dose Given Next Dose Due   docusate sodium 100 mg capsule  Commonly known as: Colace           furosemide 40 mg tablet  Commonly known as: Lasix           ipratropium-albuteroL 0.5-2.5 mg/3 mL nebulizer solution  Commonly known as: Duo-Neb           polyethylene glycol 17 gram/dose powder  Commonly known as: Glycolax, Miralax           Synthroid 50 mcg tablet  Generic drug: levothyroxine                     Where to Get Your Medications        These medications were sent to [id number] - [place] [address] [facility] AT [facility] & [place] [address] [facility] [address], [facility] [address]      Phone: [phone]   acetaminophen 325 mg tablet       Information about where to get these medications is not yet available    Ask your nurse or doctor about these medications  morphine 2 mg/mL injection  oxygen gas therapy         Test Results Pending At Discharge  Pending Labs       No current pending labs.            Hospital Course           Ren Wong MD  Physician  Internal Medicine     Progress Notes      Signed     Date of Service: 1/19/2024  1:26 PM     Signed       Expand All Collapse All    Fidelina Milton is a 83 y.o. female on day 6 of admission presenting with Shortness of breath.            Subjective   Patient is a 83 year old female on day 3 of admission presenting with shortness of breath. Patient has a history of  Hypothyroidism, asthma, hypertension, GERD, MGUS, chronic anemia, hyperlipidemia, history of COPD on BiPAP.              Objective   Last Recorded Vitals  /61   Pulse 78   Temp 36 °C (96.8 °F)   Resp 20   Wt (!) 43.1 kg (95 lb 0.3 oz)   SpO2 93%   Intake/Output last 3 Shifts:     Intake/Output Summary (Last 24 hours) at 1/19/2024 1326  Last data filed at 1/19/2024 0924      Gross per 24 hour   Intake 200 ml   Output --   Net 200 ml            Admission Weight  Weight: (!) 43.1 kg (95 lb) (01/13/24 1431)     Daily Weight  01/18/24 : (!) 43.1 kg (95 lb 0.3 oz)     Image Results  XR chest 1 view  Narrative: Interpreted By:  Bree Gee,   STUDY:  XR CHEST 1 VIEW;  1/16/2024 1:37 pm      INDICATION:  Signs/Symptoms:COVID.      COMPARISON:  01/13/2024; 04/07/2009      ACCESSION NUMBER(S):  [id number]      ORDERING CLINICIAN:  MICHEL BERUMEN      TECHNIQUE:  2 portable images of the chest were obtained.      FINDINGS:  The patient is markedly rotated. The chin is superimposed on the  right apex. Heart appears normal in size.      There is bilateral parenchymal infiltration. There is no obvious  pleural fluid.      Bones appear osteoporotic.      COMPARISON OF FINDING:  The chest is similar. In 2009 there was bilateral upper lobe scarring.      Impression: Limited exam. Bilateral parenchymal infiltration.      MACRO:  none      Signed by: Bree Gee 1/16/2024 2:39 PM  Dictation workstation:   [id number]  ECG 12 lead  Atrial fibrillation with rapid ventricular response with premature ventricular or aberrantly conducted complexes  Lateral infarct (cited on or before 15-ISRAEL-2024)  Abnormal ECG  When compared with ECG of 15-ISRAEL-2024 16:56, (unconfirmed)  Serial changes of evolving Lateral infarct Present  Confirmed by Rafa Mcintosh ([id number]) on 1/16/2024 10:49:25 AM        Physical  Exam  Constitutional:       General: She is awake.      Appearance: Normal appearance.   Cardiovascular:      Rate and Rhythm: Normal rate and regular rhythm.      Heart sounds: Normal heart sounds.   Pulmonary:      Effort: Pulmonary effort is normal.      Breath sounds: Normal breath sounds and air entry.   Abdominal:      General: Bowel sounds are normal.      Palpations: Abdomen is soft.   Neurological:      General: No focal deficit present.      Mental Status: She is alert and oriented to person, place, and time.   Psychiatric:         Attention and Perception: Attention normal.         Mood and Affect: Mood and affect normal.         Speech: Speech normal.         Behavior: Behavior normal.            Relevant Results        Scheduled medications  apixaban, 2.5 mg, oral, q12h  dexAMETHasone, 6 mg, intravenous, q12h  [Held by provider] furosemide, 80 mg, oral, Daily  ipratropium-albuteroL, 3 mL, nebulization, q4h  levothyroxine, 50 mcg, oral, Daily  meropenem, 1 g, intravenous, q12h  metoprolol tartrate, 25 mg, oral, BID  pantoprazole, 40 mg, oral, Daily before breakfast   Or  pantoprazole, 40 mg, intravenous, Daily before breakfast  perflutren lipid microspheres, 0.5-10 mL of dilution, intravenous, Once in imaging  perflutren protein A microsphere, 0.5 mL, intravenous, Once in imaging  sulfur hexafluoride microsphr, 2 mL, intravenous, Once in imaging        Continuous medications  D5 % and 0.9 % sodium chloride, 60 mL/hr, Last Rate: 60 mL/hr (01/19/24 0512)        PRN medications  PRN medications: acetaminophen **OR** acetaminophen **OR** acetaminophen, ammonium lactate, guaiFENesin, hydrALAZINE, ipratropium-albuteroL, oxygen, oxygen, polyethylene glycol  No results found for this or any previous visit (from the past 24 hour(s)).                       Assessment/Plan   This patient currently has cardiac telemetry ordered; if you would like to modify or discontinue the telemetry order, click hereto go to the  orders activity to modify/discontinue the order.              Principal Problem:    Shortness of breath     Patient was fully evaluated on January 16, 2024. ABG and ECHO needed. Will recheck labs in AM.  Patient with A-fib with RVR converted to sinus rhythm with Cardizem.  Clinically improved.  Echocardiogram ordered.     Patient was fully evaluated on January 17, 2024. Patient is on BIPAP FIO2 40%. Will recheck labs in AM.      Patient fully evaluated on January 18, 2024. Patient alternates between BIPAP and airvo. Will recheck labs in AM.      Patient fully evaluated on January 19, 2024. Patient was saturating at 92%. Patient was noted to tachycardic. Will recheck labs in AM.                     Malika Otero                    Revision History    Patient's case discussed with full family including daughter and  and they are agreeable to DNR CC with hospice care.  Patient has been intolerant of BiPAP.  Patient presently on Airvo with morphine IV.  Hospice consultation obtained.  Pertinent Physical Exam At Time of Discharge  Physical Exam    Outpatient Follow-Up  No future appointments.      Ren Wong MD

## 2024-01-20 NOTE — CONSULTS
Assessment and Plan  Patient is 83 y.o. female  with the following medical Problems:  COVID-19 pneumonia  Bilateral acquired pneumonia likely bacterial.  Acute on chronic hypoxic & hypercapnic respiratory failure requiring BiPAP  Severe pulmonary hypertension based on echocardiography dated February 28, 2019  Interstitial lung disease  COPD with acute exacerbation  Bronchiectasis    Plan of Care:  Patient continues to decline despite maximal support.  Case was discussed with the family and patient will be transition to comfort care measures.  Hospice consultation was obtained.    History of Present Illness:   History is mostly obtained from the chart as patient is unable to provide history.  Patient is a 83 y.o. female with Hypothyroidism, asthma, hypertension, GERD, MGUS, chronic anemia, hyperlipidemia, history of COPD on BiPAP presenting to [facility] from home due to hypoxemia and somnolence with difficulty of breathing.  History was obtained from family at bedside family had noticed that patient had been hypoxemic and not tolerating her BiPAP as much at home which prompted them to come to the emergency department for further evaluation.  Patient is a rather poor historian and unable to provide me with any meaningful information due to her current mental state.  Given concern for hypoxic respiratory failure, CT was consulted however at this time patient's CODE STATUS has been changed to DNR CCA and DO NOT INTUBATE.  Per chart review, patient has advanced COPD and has been on noninvasive ventilation for about 4 years with hypoxemic respiratory failure on 3 L nasal cannula throughout the day and BiPAP at night.  Per family, they are having trouble oxygenating the patient for the last few days and noted her O2 saturation has been dropping to the 60s and has been getting much more fatigued and lethargic and confused which prompted her not to be eating or drinking as much.  While patient was in the emergency department,  venous blood gas showed pCO2 of 123 with pH 7.33.  Chest x-ray shows consolidation consistent with pneumonia and patient tested positive for COVID-19 today.  There is also noted that patient tested positive for COVID-19 back in November 2023.  Currently patient is on BiPAP 15/5 with 40% FiO2 saturating in the 90s .    Daily progress:  January 15, 2024: Patient is a chronic retainer with a CO2 in the 100s.  She was placed on BiPAP 4 hours on, 4 hours off overnight.  She is awake and interactive today however repeated blood gases showed a pH of 7.35 and pCO2 of 125.  She remains on supplemental oxygen.  Continues to be on remdesivir, Decadron, and meropenem.  Vancomycin was stopped as MRSA swab was negative.    January 16, 2024: Patient continues to require BiPAP intermittently alternating with supplemental oxygen.  She was awake and arousable this morning but falls asleep easily.  ABGs showed acute on chronic hypoxic and hypercapnic respiratory failure.  Patient remains on Eliquis and was started on beta-blockers.  Responds to questions appropriately when asked.    January 17, 2024: Patient's clinical continues to be on supplemental oxygen alternating with BiPAP.  She has no fever, chills, rigors.  She continues to desaturate with 15 L high flow oxygen.  She is declining quickly.  Patient's oxygen requirement has increased significantly and she is becoming critically ill.  Patient is currently DNR/DNI.    January 18, 2024: Patient remains on heated high flow oxygen.  He is awake and communicating today however she continues to be weak and debilitated.  She is still awaiting speech therapy evaluation.  She falls easily asleep however she denies chest pain, fever, chills, rigors.    January 19, 2024: Patient continues to be on BiPAP alternating with heated high flow oxygen.  She remains lethargic and weak.  Oral intake has been poor.  Patient has been maintained on IV fluid.  Patient remains very ill.    January 20,  2024: Patient continues to decline despite maximal support.  Case was discussed with the family and patient will be transition to comfort care measures.  Hospice consultation was obtained.    Past Medical/Surgical History:   Past Medical History:   Diagnosis Date    Diaphragmatic hernia without obstruction or gangrene 10/27/2019    Hiatal hernia    Diverticulosis of intestine, part unspecified, without perforation or abscess without bleeding 08/16/2016    Diverticulosis    Personal history of diseases of the blood and blood-forming organs and certain disorders involving the immune mechanism     History of anemia    Personal history of other diseases of the circulatory system     History of hypertension    Personal history of other diseases of the digestive system     History of gastroesophageal reflux (GERD)    Personal history of other diseases of the nervous system and sense organs     History of hearing loss    Personal history of other diseases of the respiratory system     History of asthma    Personal history of other diseases of the respiratory system     History of chronic obstructive lung disease    Personal history of other diseases of the respiratory system     History of bronchitis    Personal history of other diseases of urinary system     H/O bladder problems    Personal history of other endocrine, nutritional and metabolic disease     History of hypothyroidism    Personal history of other endocrine, nutritional and metabolic disease     History of hyperlipidemia    Personal history of other endocrine, nutritional and metabolic disease     History of thyroid disorder    Personal history of other specified conditions     History of heartburn    Personal history of pneumonia (recurrent)     History of pneumonia    Shortness of breath     SOB (shortness of breath) on exertion    Unspecified urinary incontinence     Incontinence     Past Surgical History:   Procedure Laterality Date    OTHER SURGICAL  HISTORY  06/28/2019    Tonsillectomy with adenoidectomy    TONSILLECTOMY  05/12/2016    Tonsillectomy       Family History:   No relevant family history has been documented for this patient.    Allergies:     Allergies   Allergen Reactions    Bactrim [Sulfamethoxazole-Trimethoprim] GI Upset    Cefadroxil GI Upset    Clarithromycin GI Upset    Codeine GI Upset    Erythromycin GI Upset         Social history:   reports that she does not drink alcohol and does not use drugs.    Current Medications:   No recently discontinued medications to reconcile    Review of Systems:   General: denies weight gain, denies loss of appetite, fever, chills, night sweats.  HEENT: denies headaches, dizziness, head trauma, visual changes, eye pain, tinnitus, nosebleeds, hoarseness or throat pain    Respiratory: denies chest pain, +ve dyspnea, cough but no hemoptysis  Cardiovascular: denies orthopnea, paroxysmal nocturnal dyspnea, leg swelling, and previous heart attack.    Gastrointestinal: denies pain, nausea vomiting, diarrhea, constipation, melena or bleeding.  Genitourinary: denies hematuria, frequency, urgency or dysuria  Neurology: denies syncope, seizures, paralysis, paraesthesia   Endocrine: denies polyuria, polydipsia, skin or hair changes, and heat or cold intolerance  Musculoskeletal: denies joint pain, swelling, arthritis or myalgia  Hematologic: denies bleeding, adenopathy and easy bruising  Skin: denies rashes and skin discoloration  Psychiatry: denies depression    Physical Exam:   Vital Signs:   Vitals:    01/20/24 1411   BP:    Pulse:    Resp:    Temp:    SpO2: 97%       Input/Output:    Intake/Output Summary (Last 24 hours) at 1/20/2024 1455  Last data filed at 1/20/2024 0957  Gross per 24 hour   Intake 200 ml   Output --   Net 200 ml         Oxygen requirements:    Ventilator Information:  FiO2 (%):  [40 %-70 %] 70 %  S RR:  [25] 25          General appearance: ill looking, lethargic, Not in pain or distress, in no  respiratory distress    HEENT: Atraumatic/normocephalic, EOMI, LEXI, pharynx clear, moist mucosa, redness of the uvula appreciated,   Neck: Supple, no jugular venous distension, lymphadenopathy, thyromegaly or carotid bruits  Chest: Severely diminised breath sounds, no wheezing, no crackles and no tenderness over ribs   Cardiovascular: Normal S1, S2, regular rate and rhythm, no murmur, rub or gallop  Abdomen: Normal sounds present, soft, lax with no tenderness, no hepatosplenomegaly, and no masses  Extremities: No edema. Pulses are equally present.   Skin: intact, no rashes   Neurologic: Alert and oriented x 1-2, No focal deficit     Investigations:  Labs, radiological imaging and cardiac work up were personally reviewed        ICU STAFF PHYSICIAN NOTE OF PERSONAL INVOLVEMENT IN CARE  As the attending physician, I certify that I personally reviewed the patient's history and personally examined the patient to confirm the physical findings described above, and that I reviewed the relevant imaging studies and available reports.  I also discussed the differential diagnosis and all of the proposed management plans with the patient and individuals accompanying the patient to this visit.  They had the opportunity to ask questions about the proposed management plans and to have those questions answered.    This patient has a high probability of sudden, clinically significant deterioration, which requires the highest level of physician preparedness to intervene urgently.  I managed/supervised life or organ supporting interventions that required frequent physician assessment.  I devoted my full attention to the direct care of this patient for the amount of time indicated below.  Time I spent with family or surrogate(s) is included only if the patient was incapable of providing the necessary information or participating in medical decision-making.  Time devoted to teaching and to any procedures I billed separately is not  included.  Critical Care Time: 31 minutes

## 2024-01-20 NOTE — CARE PLAN
The patient's goals for the shift include      The clinical goals for the shift include comfort and support    Problem: Skin  Goal: Decreased wound size/increased tissue granulation at next dressing change  Outcome: Progressing  Flowsheets (Taken 1/20/2024 1153)  Decreased wound size/increased tissue granulation at next dressing change: Promote sleep for wound healing  Goal: Participates in plan/prevention/treatment measures  Outcome: Progressing  Flowsheets (Taken 1/17/2024 1951 by Kya Bui RN)  Participates in plan/prevention/treatment measures: Elevate heels  Goal: Prevent/manage excess moisture  Outcome: Progressing  Flowsheets (Taken 1/20/2024 1153)  Prevent/manage excess moisture: Moisturize dry skin  Goal: Prevent/minimize sheer/friction injuries  Outcome: Progressing  Flowsheets (Taken 1/20/2024 1153)  Prevent/minimize sheer/friction injuries: HOB 30 degrees or less  Goal: Promote/optimize nutrition  Outcome: Progressing  Flowsheets (Taken 1/20/2024 1153)  Promote/optimize nutrition: Assist with feeding  Goal: Promote skin healing  Outcome: Progressing  Flowsheets (Taken 1/20/2024 1153)  Promote skin healing: Assess skin/pad under line(s)/device(s)

## 2024-01-20 NOTE — CARE PLAN
The patient's goals for the shift include      The clinical goals for the shift include comfort

## 2024-01-21 ENCOUNTER — HOSPITAL ENCOUNTER (INPATIENT)
Facility: HOSPITAL | Age: 84
LOS: 2 days | End: 2024-01-23
Attending: INTERNAL MEDICINE | Admitting: INTERNAL MEDICINE

## 2024-01-21 VITALS
BODY MASS INDEX: 17.49 KG/M2 | DIASTOLIC BLOOD PRESSURE: 59 MMHG | HEART RATE: 88 BPM | RESPIRATION RATE: 18 BRPM | OXYGEN SATURATION: 90 % | WEIGHT: 95.02 LBS | SYSTOLIC BLOOD PRESSURE: 104 MMHG | TEMPERATURE: 95.4 F | HEIGHT: 62 IN

## 2024-01-21 DIAGNOSIS — J80 ARDS (ADULT RESPIRATORY DISTRESS SYNDROME) (MULTI): Primary | ICD-10-CM

## 2024-01-21 PROCEDURE — 2500000002 HC RX 250 W HCPCS SELF ADMINISTERED DRUGS (ALT 637 FOR MEDICARE OP, ALT 636 FOR OP/ED): Performed by: STUDENT IN AN ORGANIZED HEALTH CARE EDUCATION/TRAINING PROGRAM

## 2024-01-21 PROCEDURE — 1150000001 HC HOSPICE PRIVATE ROOM DAILY

## 2024-01-21 PROCEDURE — 2500000004 HC RX 250 GENERAL PHARMACY W/ HCPCS (ALT 636 FOR OP/ED): Performed by: INTERNAL MEDICINE

## 2024-01-21 PROCEDURE — 94660 CPAP INITIATION&MGMT: CPT

## 2024-01-21 PROCEDURE — 2500000004 HC RX 250 GENERAL PHARMACY W/ HCPCS (ALT 636 FOR OP/ED)

## 2024-01-21 PROCEDURE — C9113 INJ PANTOPRAZOLE SODIUM, VIA: HCPCS

## 2024-01-21 PROCEDURE — 94640 AIRWAY INHALATION TREATMENT: CPT

## 2024-01-21 PROCEDURE — 2500000001 HC RX 250 WO HCPCS SELF ADMINISTERED DRUGS (ALT 637 FOR MEDICARE OP): Performed by: INTERNAL MEDICINE

## 2024-01-21 RX ORDER — HYOSCYAMINE SULFATE 0.12 MG/1
0.12 TABLET, ORALLY DISINTEGRATING ORAL EVERY 4 HOURS PRN
Status: DISCONTINUED | OUTPATIENT
Start: 2024-01-21 | End: 2024-01-23 | Stop reason: HOSPADM

## 2024-01-21 RX ORDER — LORAZEPAM 0.5 MG/1
0.5 TABLET ORAL EVERY 4 HOURS PRN
Status: DISCONTINUED | OUTPATIENT
Start: 2024-01-21 | End: 2024-01-23 | Stop reason: HOSPADM

## 2024-01-21 RX ADMIN — LORAZEPAM 0.5 MG: 0.5 TABLET ORAL at 21:08

## 2024-01-21 RX ADMIN — PANTOPRAZOLE SODIUM 40 MG: 40 INJECTION, POWDER, FOR SOLUTION INTRAVENOUS at 08:08

## 2024-01-21 RX ADMIN — HYDROMORPHONE HYDROCHLORIDE 0.4 MG: 1 INJECTION, SOLUTION INTRAMUSCULAR; INTRAVENOUS; SUBCUTANEOUS at 16:29

## 2024-01-21 RX ADMIN — HYDROMORPHONE HYDROCHLORIDE 0.2 MG: 0.2 INJECTION, SOLUTION INTRAMUSCULAR; INTRAVENOUS; SUBCUTANEOUS at 13:52

## 2024-01-21 RX ADMIN — HYDROMORPHONE HYDROCHLORIDE 0.4 MG: 1 INJECTION, SOLUTION INTRAMUSCULAR; INTRAVENOUS; SUBCUTANEOUS at 20:02

## 2024-01-21 RX ADMIN — HYDROMORPHONE HYDROCHLORIDE 0.2 MG: 0.2 INJECTION, SOLUTION INTRAMUSCULAR; INTRAVENOUS; SUBCUTANEOUS at 18:38

## 2024-01-21 RX ADMIN — LORAZEPAM 0.5 MG: 0.5 TABLET ORAL at 15:29

## 2024-01-21 RX ADMIN — IPRATROPIUM BROMIDE AND ALBUTEROL SULFATE 3 ML: .5; 3 SOLUTION RESPIRATORY (INHALATION) at 06:28

## 2024-01-21 RX ADMIN — IPRATROPIUM BROMIDE AND ALBUTEROL SULFATE 3 ML: .5; 3 SOLUTION RESPIRATORY (INHALATION) at 10:55

## 2024-01-21 RX ADMIN — MEROPENEM 1 G: 1 INJECTION, POWDER, FOR SOLUTION INTRAVENOUS at 08:07

## 2024-01-21 RX ADMIN — DEXAMETHASONE SODIUM PHOSPHATE 6 MG: 10 INJECTION INTRAMUSCULAR; INTRAVENOUS at 08:08

## 2024-01-21 ASSESSMENT — PAIN SCALES - PAIN ASSESSMENT IN ADVANCED DEMENTIA (PAINAD)
CONSOLABILITY: NO NEED TO CONSOLE
BODYLANGUAGE: RELAXED
NEGVOCALIZATION: OCCASIONAL MOAN/GROAN, LOW SPEECH, NEGATIVE/DISAPPROVING QUALITY
TOTALSCORE: 3
CONSOLABILITY: NO NEED TO CONSOLE
TOTALSCORE: MEDICATION (SEE MAR)
FACIALEXPRESSION: SMILING OR INEXPRESSIVE
CONSOLABILITY: NO NEED TO CONSOLE
TOTALSCORE: 3
FACIALEXPRESSION: SMILING OR INEXPRESSIVE
NEGVOCALIZATION: OCCASIONAL MOAN/GROAN, LOW SPEECH, NEGATIVE/DISAPPROVING QUALITY
TOTALSCORE: 2
BREATHING: NOISY LABORED BREATHING, LONG PERIODS OF HYPERVENTILATION, CHEYNE-STOKES RESPIRATIONS
TOTALSCORE: RELAXED
FACIALEXPRESSION: SMILING OR INEXPRESSIVE
BREATHING: NOISY LABORED BREATHING, LONG PERIODS OF HYPERVENTILATION, CHEYNE-STOKES RESPIRATIONS
TOTALSCORE: RELAXED
NEGVOCALIZATION: OCCASIONAL MOAN/GROAN, LOW SPEECH, NEGATIVE/DISAPPROVING QUALITY
BREATHING: OCCASIONAL LABORED BREATHING, SHORT PERIOD OF HYPERVENTILATION
TOTALSCORE: MEDICATION (SEE MAR)
BODYLANGUAGE: RELAXED
BODYLANGUAGE: RELAXED

## 2024-01-21 ASSESSMENT — COGNITIVE AND FUNCTIONAL STATUS - GENERAL
HELP NEEDED FOR BATHING: TOTAL
TURNING FROM BACK TO SIDE WHILE IN FLAT BAD: TOTAL
MOVING FROM LYING ON BACK TO SITTING ON SIDE OF FLAT BED WITH BEDRAILS: TOTAL
PERSONAL GROOMING: TOTAL
WALKING IN HOSPITAL ROOM: TOTAL
DAILY ACTIVITIY SCORE: 6
DRESSING REGULAR LOWER BODY CLOTHING: TOTAL
CLIMB 3 TO 5 STEPS WITH RAILING: TOTAL
DRESSING REGULAR UPPER BODY CLOTHING: TOTAL
MOVING TO AND FROM BED TO CHAIR: TOTAL
STANDING UP FROM CHAIR USING ARMS: TOTAL
TOILETING: TOTAL
MOBILITY SCORE: 6
EATING MEALS: TOTAL

## 2024-01-21 ASSESSMENT — PAIN - FUNCTIONAL ASSESSMENT
PAIN_FUNCTIONAL_ASSESSMENT: PAINAD (PAIN ASSESSMENT IN ADVANCED DEMENTIA SCALE)

## 2024-01-21 ASSESSMENT — PAIN SCALES - WONG BAKER: WONGBAKER_NUMERICALRESPONSE: NO HURT

## 2024-01-21 ASSESSMENT — PAIN SCALES - GENERAL: PAINLEVEL_OUTOF10: 0 - NO PAIN

## 2024-01-21 NOTE — DISCHARGE SUMMARY
Discharge Diagnosis  Shortness of breath    Issues Requiring Follow-Up  Patient fully evaluated on January 20 and still significant short of breath.    Discharge Meds     Your medication list        START taking these medications        Instructions Last Dose Given Next Dose Due   acetaminophen 325 mg tablet  Commonly known as: Tylenol      Take 2 tablets (650 mg) by mouth every 4 hours if needed for mild pain (1 - 3).       morphine 2 mg/mL injection      Infuse 0.5 mL (1 mg) into a venous catheter every 2 hours.       oxygen gas therapy  Commonly known as: O2      Inhale 5 L/min once every 24 hours.              CONTINUE taking these medications        Instructions Last Dose Given Next Dose Due   docusate sodium 100 mg capsule  Commonly known as: Colace           furosemide 40 mg tablet  Commonly known as: Lasix           ipratropium-albuteroL 0.5-2.5 mg/3 mL nebulizer solution  Commonly known as: Duo-Neb           polyethylene glycol 17 gram/dose powder  Commonly known as: Glycolax, Miralax           Synthroid 50 mcg tablet  Generic drug: levothyroxine                     Where to Get Your Medications        These medications were sent to [facility][id number] - [place] [address] AT [facility] & [place] [address], [facility] [address]      Phone: [phone]   acetaminophen 325 mg tablet       Information about where to get these medications is not yet available    Ask your nurse or doctor about these medications  morphine 2 mg/mL injection  oxygen gas therapy         Test Results Pending At Discharge  Pending Labs       No current pending labs.            Hospital Course           Ren Wong MD  Physician  Internal Medicine     Progress Notes      Signed     Date of Service: 1/19/2024  1:26 PM     Signed       Expand All Collapse All    Fidelina Milton is a 83 y.o. female on day 6 of admission presenting with Shortness of breath.            Subjective   Patient is a 83 year old female on day 3 of admission presenting with shortness of breath. Patient has a history of  Hypothyroidism, asthma, hypertension, GERD, MGUS, chronic anemia, hyperlipidemia, history of COPD on BiPAP.              Objective   Last Recorded Vitals  /61   Pulse 78   Temp 36 °C (96.8 °F)   Resp 20   Wt (!) 43.1 kg (95 lb 0.3 oz)   SpO2 93%   Intake/Output last 3 Shifts:     Intake/Output Summary (Last 24 hours) at 1/19/2024 1326  Last data filed at 1/19/2024 0924      Gross per 24 hour   Intake 200 ml   Output --   Net 200 ml            Admission Weight  Weight: (!) 43.1 kg (95 lb) (01/13/24 1431)     Daily Weight  01/18/24 : (!) 43.1 kg (95 lb 0.3 oz)     Image Results  XR chest 1 view  Narrative: Interpreted By:  Bree Gee,   STUDY:  XR CHEST 1 VIEW;  1/16/2024 1:37 pm      INDICATION:  Signs/Symptoms:COVID.      COMPARISON:  01/13/2024; 04/07/2009      ACCESSION NUMBER(S):  [id number]      ORDERING CLINICIAN:  MICHEL BERUMEN      TECHNIQUE:  2 portable images of the chest were obtained.      FINDINGS:  The patient is markedly rotated. The chin is superimposed on the  right apex. Heart appears normal in size.      There is bilateral parenchymal infiltration. There is no obvious  pleural fluid.      Bones appear osteoporotic.      COMPARISON OF FINDING:  The chest is similar. In 2009 there was bilateral upper lobe scarring.      Impression: Limited exam. Bilateral parenchymal infiltration.      MACRO:  none      Signed by: Bree Gee 1/16/2024 2:39 PM  Dictation workstation:   [id number]  ECG 12 lead  Atrial fibrillation with rapid ventricular response with premature ventricular or aberrantly conducted complexes  Lateral infarct (cited on or before 15-ISRAEL-2024)  Abnormal ECG  When compared with ECG of 15-ISRAEL-2024 16:56, (unconfirmed)  Serial changes of evolving Lateral infarct Present  Confirmed by Rafa Mcintosh ([id number]) on 1/16/2024 10:49:25 AM        Physical  Exam  Constitutional:       General: She is awake.      Appearance: Normal appearance.   Cardiovascular:      Rate and Rhythm: Normal rate and regular rhythm.      Heart sounds: Normal heart sounds.   Pulmonary:      Effort: Pulmonary effort is normal.      Breath sounds: Normal breath sounds and air entry.   Abdominal:      General: Bowel sounds are normal.      Palpations: Abdomen is soft.   Neurological:      General: No focal deficit present.      Mental Status: She is alert and oriented to person, place, and time.   Psychiatric:         Attention and Perception: Attention normal.         Mood and Affect: Mood and affect normal.         Speech: Speech normal.         Behavior: Behavior normal.            Relevant Results        Scheduled medications  apixaban, 2.5 mg, oral, q12h  dexAMETHasone, 6 mg, intravenous, q12h  [Held by provider] furosemide, 80 mg, oral, Daily  ipratropium-albuteroL, 3 mL, nebulization, q4h  levothyroxine, 50 mcg, oral, Daily  meropenem, 1 g, intravenous, q12h  metoprolol tartrate, 25 mg, oral, BID  pantoprazole, 40 mg, oral, Daily before breakfast   Or  pantoprazole, 40 mg, intravenous, Daily before breakfast  perflutren lipid microspheres, 0.5-10 mL of dilution, intravenous, Once in imaging  perflutren protein A microsphere, 0.5 mL, intravenous, Once in imaging  sulfur hexafluoride microsphr, 2 mL, intravenous, Once in imaging        Continuous medications  D5 % and 0.9 % sodium chloride, 60 mL/hr, Last Rate: 60 mL/hr (01/19/24 0576)        PRN medications  PRN medications: acetaminophen **OR** acetaminophen **OR** acetaminophen, ammonium lactate, guaiFENesin, hydrALAZINE, ipratropium-albuteroL, oxygen, oxygen, polyethylene glycol  No results found for this or any previous visit (from the past 24 hour(s)).                       Assessment/Plan   This patient currently has cardiac telemetry ordered; if you would like to modify or discontinue the telemetry order, click hereto go to the  orders activity to modify/discontinue the order.              Principal Problem:    Shortness of breath     Patient was fully evaluated on January 16, 2024. ABG and ECHO needed. Will recheck labs in AM.  Patient with A-fib with RVR converted to sinus rhythm with Cardizem.  Clinically improved.  Echocardiogram ordered.     Patient was fully evaluated on January 17, 2024. Patient is on BIPAP FIO2 40%. Will recheck labs in AM.      Patient fully evaluated on January 18, 2024. Patient alternates between BIPAP and airvo. Will recheck labs in AM.      Patient fully evaluated on January 19, 2024. Patient was saturating at 92%. Patient was noted to tachycardic. Will recheck labs in AM.                     Malika Otero                    Revision History    Patient's case discussed with full family including daughter and  and they are agreeable to DNR CC with hospice care.  Patient has been intolerant of BiPAP.  Patient presently on Airvo with morphine IV.  Hospice consultation obtained.  Pertinent Physical Exam At Time of Discharge  Physical Exam    Outpatient Follow-Up  No future appointments.    Patient fully evaluated on January 21 and minimally responsive.  Plan for hospice GIP today.  Comfort medications in place.  Ren Wong MD

## 2024-01-21 NOTE — NURSING NOTE
RN Hospice Note    Fidelina Milton is a Hospice Patient.   Hospice terminal diagnosis: Acute on Chronic Hypoxic and Hypercapnic Respiratory Failure  Physician: Dr. Ren Wong  Visit type: GIP Admission    Comments/recommendations: Consents for hospice care signed. Pt placed into hospice symptom control status for shortness of breath.  , Black and daughter, Radha at bedside.  Trained on hospice symptom control and comfort medications for management of shortness of breath.  Family verbalized wish/agreement for oxygen to be decreased from high flow oxygen to nasal cannula and stop all routine medications except comfort medications. Dr. Ren Wong to place comfort medication orders.  Hospice to follow up 1/22/2024.       Plan of care reviewed with patient/family members:  1) Black Milton,   2) Radha Reynoso, daughter     Plan of care reviewed with hospital staff members:   1) Héctor Chow RN  2) MARIO ALBERTO Manzo  3) Dr. Ren Wong     Please notify [facility] of any changes in condition. Thank you.  Office: [phone] (8 am-6:30 pm M-F and 8 am-4:30 pm weekends and holidays)   [phone] (6:30 pm-8 am M-F and 4:30 pm-8 am weekends and holidays)    Alison Wood RN

## 2024-01-21 NOTE — CARE PLAN
Problem: Skin  Goal: Decreased wound size/increased tissue granulation at next dressing change  Outcome: Progressing  Goal: Participates in plan/prevention/treatment measures  Outcome: Progressing  Goal: Prevent/manage excess moisture  Outcome: Progressing  Goal: Prevent/minimize sheer/friction injuries  Outcome: Progressing  Goal: Promote/optimize nutrition  Outcome: Progressing  Goal: Promote skin healing  Outcome: Progressing

## 2024-01-22 PROCEDURE — 1150000001 HC HOSPICE PRIVATE ROOM DAILY

## 2024-01-22 PROCEDURE — 2500000004 HC RX 250 GENERAL PHARMACY W/ HCPCS (ALT 636 FOR OP/ED): Performed by: INTERNAL MEDICINE

## 2024-01-22 RX ADMIN — HYDROMORPHONE HYDROCHLORIDE 0.2 MG: 0.2 INJECTION, SOLUTION INTRAMUSCULAR; INTRAVENOUS; SUBCUTANEOUS at 19:56

## 2024-01-22 RX ADMIN — HYDROMORPHONE HYDROCHLORIDE 0.4 MG: 1 INJECTION, SOLUTION INTRAMUSCULAR; INTRAVENOUS; SUBCUTANEOUS at 00:40

## 2024-01-22 RX ADMIN — HYDROMORPHONE HYDROCHLORIDE 0.4 MG: 1 INJECTION, SOLUTION INTRAMUSCULAR; INTRAVENOUS; SUBCUTANEOUS at 14:25

## 2024-01-22 RX ADMIN — HYDROMORPHONE HYDROCHLORIDE 0.4 MG: 1 INJECTION, SOLUTION INTRAMUSCULAR; INTRAVENOUS; SUBCUTANEOUS at 08:26

## 2024-01-22 RX ADMIN — HYDROMORPHONE HYDROCHLORIDE 0.4 MG: 1 INJECTION, SOLUTION INTRAMUSCULAR; INTRAVENOUS; SUBCUTANEOUS at 06:06

## 2024-01-22 ASSESSMENT — COGNITIVE AND FUNCTIONAL STATUS - GENERAL
TURNING FROM BACK TO SIDE WHILE IN FLAT BAD: TOTAL
PERSONAL GROOMING: TOTAL
MOVING TO AND FROM BED TO CHAIR: TOTAL
STANDING UP FROM CHAIR USING ARMS: TOTAL
HELP NEEDED FOR BATHING: TOTAL
CLIMB 3 TO 5 STEPS WITH RAILING: TOTAL
EATING MEALS: TOTAL
MOVING FROM LYING ON BACK TO SITTING ON SIDE OF FLAT BED WITH BEDRAILS: TOTAL
DRESSING REGULAR UPPER BODY CLOTHING: TOTAL
WALKING IN HOSPITAL ROOM: TOTAL
TOILETING: TOTAL
MOBILITY SCORE: 6
DRESSING REGULAR LOWER BODY CLOTHING: TOTAL
DAILY ACTIVITIY SCORE: 6

## 2024-01-22 ASSESSMENT — PAIN SCALES - PAIN ASSESSMENT IN ADVANCED DEMENTIA (PAINAD)
FACIALEXPRESSION: SMILING OR INEXPRESSIVE
TOTALSCORE: RELAXED
TOTALSCORE: 1
TOTALSCORE: RESTING QUIETLY
BREATHING: OCCASIONAL LABORED BREATHING, SHORT PERIOD OF HYPERVENTILATION
FACIALEXPRESSION: SMILING OR INEXPRESSIVE
BODYLANGUAGE: RELAXED
FACIALEXPRESSION: SMILING OR INEXPRESSIVE
TOTALSCORE: MEDICATION (SEE MAR)
CONSOLABILITY: NO NEED TO CONSOLE
TOTALSCORE: 1
CONSOLABILITY: NO NEED TO CONSOLE
TOTALSCORE: RELAXED
TOTALSCORE: MEDICATION (SEE MAR)
FACIALEXPRESSION: SMILING OR INEXPRESSIVE
NEGVOCALIZATION: OCCASIONAL MOAN/GROAN, LOW SPEECH, NEGATIVE/DISAPPROVING QUALITY
BREATHING: OCCASIONAL LABORED BREATHING, SHORT PERIOD OF HYPERVENTILATION
BREATHING: NOISY LABORED BREATHING, LONG PERIODS OF HYPERVENTILATION, CHEYNE-STOKES RESPIRATIONS
BODYLANGUAGE: RELAXED
BODYLANGUAGE: RELAXED
CONSOLABILITY: NO NEED TO CONSOLE
TOTALSCORE: 1
BREATHING: OCCASIONAL LABORED BREATHING, SHORT PERIOD OF HYPERVENTILATION
BODYLANGUAGE: RELAXED
CONSOLABILITY: NO NEED TO CONSOLE
TOTALSCORE: 3

## 2024-01-22 ASSESSMENT — PAIN SCALES - WONG BAKER: WONGBAKER_NUMERICALRESPONSE: NO HURT

## 2024-01-22 ASSESSMENT — PAIN - FUNCTIONAL ASSESSMENT
PAIN_FUNCTIONAL_ASSESSMENT: PAINAD (PAIN ASSESSMENT IN ADVANCED DEMENTIA SCALE)
PAIN_FUNCTIONAL_ASSESSMENT: PAINAD (PAIN ASSESSMENT IN ADVANCED DEMENTIA SCALE)

## 2024-01-22 ASSESSMENT — PAIN SCALES - GENERAL: PAINLEVEL_OUTOF10: 0 - NO PAIN

## 2024-01-22 NOTE — CARE PLAN
The patient's goals for the shift include      The clinical goals for the shift include patient will remain pain free    Over the shift, the patient did not make progress toward the following goals. Patient skin will remain intact and refrain from breakdown

## 2024-01-22 NOTE — H&P
History Of Present Illness  Fidelina Milton is a 83 y.o. female presenting with patient with hospice GIP.     Past Medical History  She has a past medical history of Diaphragmatic hernia without obstruction or gangrene (10/27/2019), Diverticulosis of intestine, part unspecified, without perforation or abscess without bleeding (08/16/2016), Personal history of diseases of the blood and blood-forming organs and certain disorders involving the immune mechanism, Personal history of other diseases of the circulatory system, Personal history of other diseases of the digestive system, Personal history of other diseases of the nervous system and sense organs, Personal history of other diseases of the respiratory system, Personal history of other diseases of the respiratory system, Personal history of other diseases of the respiratory system, Personal history of other diseases of urinary system, Personal history of other endocrine, nutritional and metabolic disease, Personal history of other endocrine, nutritional and metabolic disease, Personal history of other endocrine, nutritional and metabolic disease, Personal history of other specified conditions, Personal history of pneumonia (recurrent), Shortness of breath, and Unspecified urinary incontinence.    Surgical History  She has a past surgical history that includes Tonsillectomy (05/12/2016) and Other surgical history (06/28/2019).     Social History  She reports that she does not drink alcohol and does not use drugs. No history on file for tobacco use.    Family History  No family history on file.     Allergies  Bactrim [sulfamethoxazole-trimethoprim], Cefadroxil, Clarithromycin, Codeine, and Erythromycin    Review of Systems     Physical Exam     Last Recorded Vitals  BP (!) 88/42   Pulse 81   Temp 35.6 °C (96.1 °F)   SpO2 98%     Relevant Results            Ren Wong MD  Physician  Internal Medicine     H&P      Signed     Date of Service: 1/13/2024  7:29  PM   Related encounter: ED to Hosp-Admission (Discharged) from 1/13/2024 in [facility] with Ren Wong MD and Liang Sharpe MD     Signed       Expand All Collapse All         History and Physical      PATIENT NAME: Fidelina Milton  MRN: [id number]     SERVICE DATE:  1/13/2024  SERVICE TIME:  7:30 PM     Chief Complaint      Patient is a 83 y.o. female admitted on 1/13/2024  2:30 PM  with chief complaint of Somnolence.      History of Presenting Illness      Patient is a 83 y.o. female w. PMHx of: Hypothyroidism, asthma, hypertension, GERD, MGUS, chronic anemia, hyperlipidemia, history of COPD on BiPAP presenting to [facility] from home due to hypoxemia and somnolence with difficulty of breathing.  History was obtained from family at bedside family had noticed that patient had been hypoxemic and not tolerating her BiPAP as much at home which prompted them to come to the emergency department for further evaluation.  Patient is a rather poor historian and unable to provide me with any meaningful information due to her current mental state.  Given concern for hypoxic respiratory failure, CT was consulted however at this time patient's CODE STATUS has been changed to DNR CCA and DO NOT INTUBATE.  Per chart review, patient has advanced COPD and has been on noninvasive ventilation for about 4 years with hypoxemic respiratory failure on 3 L nasal cannula throughout the day and BiPAP at night.  Per family, they are having trouble oxygenating the patient for the last few days and noted her O2 saturation has been dropping to the 60s and has been getting much more fatigued and lethargic and confused which prompted her not to be eating or drinking as much.  While patient was in the emergency department, venous blood gas showed pCO2 of 123 with pH 7.33.  Chest x-ray shows consolidation consistent with pneumonia and patient tested positive for COVID-19 today.  There is also noted that patient tested positive for  COVID-19 back in November 2023.  Currently patient is on BiPAP 15/5 with 40% FiO2 saturating in the 90s.  At this time patient will be admitted to stepdown unit and I was asked to do the admission H&P.     Review of Systems       Unable to be obtained from patient     Family/Medical/Surgical/Social Hx      Family History   No family history on file.     Medical History        Past Medical History:   Diagnosis Date    Diaphragmatic hernia without obstruction or gangrene 10/27/2019     Hiatal hernia    Diverticulosis of intestine, part unspecified, without perforation or abscess without bleeding 08/16/2016     Diverticulosis    Personal history of diseases of the blood and blood-forming organs and certain disorders involving the immune mechanism       History of anemia    Personal history of other diseases of the circulatory system       History of hypertension    Personal history of other diseases of the digestive system       History of gastroesophageal reflux (GERD)    Personal history of other diseases of the nervous system and sense organs       History of hearing loss    Personal history of other diseases of the respiratory system       History of asthma    Personal history of other diseases of the respiratory system       History of chronic obstructive lung disease    Personal history of other diseases of the respiratory system       History of bronchitis    Personal history of other diseases of urinary system       H/O bladder problems    Personal history of other endocrine, nutritional and metabolic disease       History of hypothyroidism    Personal history of other endocrine, nutritional and metabolic disease       History of hyperlipidemia    Personal history of other endocrine, nutritional and metabolic disease       History of thyroid disorder    Personal history of other specified conditions       History of heartburn    Personal history of pneumonia (recurrent)       History of pneumonia    Shortness  "of breath       SOB (shortness of breath) on exertion    Unspecified urinary incontinence       Incontinence         Surgical History         Past Surgical History:   Procedure Laterality Date    OTHER SURGICAL HISTORY   06/28/2019     Tonsillectomy with adenoidectomy    TONSILLECTOMY   05/12/2016     Tonsillectomy         Social History               Socioeconomic History    Marital status:        Spouse name: Not on file    Number of children: Not on file    Years of education: Not on file    Highest education level: Not on file   Occupational History    Not on file   Tobacco Use    Smoking status: Not on file    Smokeless tobacco: Not on file   Substance and Sexual Activity    Alcohol use: Never    Drug use: Never    Sexual activity: Not on file   Other Topics Concern    Not on file   Social History Narrative    Not on file      Social Determinants of Health      Financial Resource Strain: Not on file   Food Insecurity: Not on file   Transportation Needs: Not on file   Physical Activity: Not on file   Stress: Not on file   Social Connections: Not on file   Intimate Partner Violence: Not on file   Housing Stability: Not on file            Medications/Allergies      @[facility]@  Bactrim [sulfamethoxazole-trimethoprim], Cefadroxil, Clarithromycin, Codeine, and Erythromycin     Physical Exam      /63 (BP Location: Left arm, Patient Position: Sitting)   Pulse 98   Temp 36.6 °C (97.9 °F) (Temporal)   Resp 16   Ht 1.575 m (5' 2\")   Wt (!) 43.1 kg (95 lb)   SpO2 97%   BMI 17.38 kg/m²   General: Elderly female appearing very somnolent  HEENT: PERRLA. EOMi.  Neck: No JVD. No LAD.  CV: Normal rate and rhythm. No murmurs or rubs auscultated.   Resp: Bilateral expiratory wheezing noted  Abdomen: Soft, nontender, nondistended abdomen. BSx4.   Extremities: No pedal edema b/l.        Data       CBC -        Lab Results   Component Value Date     WBC 4.4 01/13/2024     HGB 11.5 (L) 01/13/2024     HCT 39.8 " "01/13/2024      (H) 01/13/2024      01/13/2024         CMP -        Lab Results   Component Value Date     CALCIUM 8.3 (L) 01/13/2024     PROT 7.3 01/13/2024     ALBUMIN 3.6 01/13/2024     AST 16 01/13/2024     ALT 6 (L) 01/13/2024     ALKPHOS 66 01/13/2024     BILITOT 0.5 01/13/2024         LIPID PANEL -   No results found for: \"CHOL\", \"TRIG\", \"HDL\", \"CHHDL\", \"LDLF\", \"VLDL\", \"NHDL\"     RENAL FUNCTION PANEL -           Lab Results   Component Value Date     GLUCOSE 103 (H) 01/13/2024      01/13/2024     K 3.9 01/13/2024     CL 83 (L) 01/13/2024     CO2 >45 (HH) 01/13/2024     ANIONGAP   01/13/2024         Comment:         One or more analytes used in this calculation is outside of the analytical measurement range. Calculation cannot be performed.     BUN 17 01/13/2024     CREATININE 0.76 01/13/2024     CALCIUM 8.3 (L) 01/13/2024     ALBUMIN 3.6 01/13/2024               Lab Results   Component Value Date     TROPHS 15 (H) 01/13/2024            Assessment and Plan      Patient is a 83 y.o. female w. PMHx of: Hypothyroidism, asthma, hypertension, GERD, MGUS, chronic anemia, hyperlipidemia, history of COPD on BiPAP presenting to [facility] from home due to hypoxemia and somnolence with difficulty of breathing.     # COPD  #Acute on chronic hypoxic respiratory failure  #COVID-19 diagnosis  #Underlying pneumonia secondary to above  #Metabolic encephalopathy secondary to above        Plan  -Patient started on remdesivir, Decadron 6 mg every day  -Patient was started on meropenem and vancomycin in the emergency department.  Will continue with antibiotics at this time  -DuoNebs every 4 hours, mucolytic therapy with Mucinex  -Plan to continue patient on noninvasive ventilation overnight  -Strep pneumo, Legionella, mycoplasma, sputum cultures pending  -Encourage incentive spirometry use as tolerated  -Aggressive bronchopulmonary hygiene.  Respiratory consulted        # Hypothyroidism     Plan  -Continue " Synthroid     # Hypertension  Plan  -Patient on Lasix 80 mg at home.  Will hold for now in the setting of soft blood pressures.           Chronic Medical Problems  # History of MGUS  # GERD  # Chronic anemia        # VTE PPx: Heparin  # GI PPx: Protonix  # Diet: Regular diet  # Dispo: Stepdown  # Code Status: DNR, DNI                   Revision History            Assessment/Plan   Principal Problem:    ARDS (adult respiratory distress syndrome) (CMS/[facility])    Patient fully evaluated on January 22.  Patient hospice GIP and having severe episodes of apnea.  Prognosis is poor.  Continue comfort measures.         Ren Wong MD

## 2024-01-22 NOTE — CARE PLAN
The patient's goals for the shift include  comfort    The clinical goals for the shift include  comfort

## 2024-01-22 NOTE — CARE PLAN
The patient's goals for the shift include      The clinical goals for the shift include patient will remain pain free    Over the shift, the patient wll remain pain free and comfortable

## 2024-01-22 NOTE — NURSING NOTE
RN Hospice Note    Fidelina Milton is a Hospice Patient.   Hospice terminal diagnosis: Acute on Chronic Resp failure/COVID 19/COPD/IPD  Physician: Dr. Wong  Visit type: Routine follow up/GIP day 2    Comments/recommendations: This RN present for follow up GIP visit.  Pt unresponsive, using 2L NC.  Pt has received 5 doses ivp dilaudid (0.4mg/dose) and 2 doses ivp dilaudid (0.2mg/dose) in last 24 hrs, also received 2 doses sl ativan (0.5 mg/dose).  Pt actively dying, pedal pulses barely palpable, vasquez feet purple/mottled, cold, vasquez hands blue and cold, pt with facial cyanosis.  Pps 10.  Spouse, Ezequiel, dtr Radha, MORIS Quintero and Ethan Spivey, at bedside, coping well, tearful at times, participating in life review with this RN.  Family aware message sent to [facility] bereavement coordinator for early follow up.  Family accepting of journey's path booklet, encouraged to give pt permission to pass, possibly give pt brief moments alone.  Family expressed much gratitude for support with poc, reassured pt appropriate for GIP LOC, no discharge planned, pt unstable for transport (b/p steadily dropping, currently 80's/40's).  [facility] will continue to monitor and follow up.  Thanks for the collaborative care of this pt and family.     Plan of care reviewed with patient/family members Ezequiel, spouse/Radha, jeniffer/Leon, moris/Allyssa, ethan  Plan of care reviewed with hospital staff members: Dr. Wong (via epic chat)/ELSA Hawkins/MARIO ALBERTO Hearn     Please notify [facility] of any changes in condition. Thank you.  Office: [phone] (8 am-6:30 pm M-F and 8 am-4:30 pm weekends and holidays)   [phone] (6:30 pm-8 am M-F and 4:30 pm-8 am weekends and holidays)    Yolanda Davenport RN

## 2024-01-23 VITALS
SYSTOLIC BLOOD PRESSURE: 88 MMHG | TEMPERATURE: 96.1 F | DIASTOLIC BLOOD PRESSURE: 42 MMHG | OXYGEN SATURATION: 98 % | HEART RATE: 81 BPM

## 2024-01-23 PROCEDURE — 2500000004 HC RX 250 GENERAL PHARMACY W/ HCPCS (ALT 636 FOR OP/ED): Performed by: INTERNAL MEDICINE

## 2024-01-23 RX ADMIN — HYDROMORPHONE HYDROCHLORIDE 0.2 MG: 0.2 INJECTION, SOLUTION INTRAMUSCULAR; INTRAVENOUS; SUBCUTANEOUS at 05:53

## 2024-01-23 ASSESSMENT — COGNITIVE AND FUNCTIONAL STATUS - GENERAL
MOVING TO AND FROM BED TO CHAIR: TOTAL
DRESSING REGULAR UPPER BODY CLOTHING: TOTAL
PERSONAL GROOMING: TOTAL
MOBILITY SCORE: 6
TOILETING: TOTAL
MOVING FROM LYING ON BACK TO SITTING ON SIDE OF FLAT BED WITH BEDRAILS: TOTAL
HELP NEEDED FOR BATHING: TOTAL
DRESSING REGULAR UPPER BODY CLOTHING: TOTAL
STANDING UP FROM CHAIR USING ARMS: TOTAL
CLIMB 3 TO 5 STEPS WITH RAILING: TOTAL
STANDING UP FROM CHAIR USING ARMS: TOTAL
MOBILITY SCORE: 6
DRESSING REGULAR LOWER BODY CLOTHING: TOTAL
TURNING FROM BACK TO SIDE WHILE IN FLAT BAD: TOTAL
MOVING FROM LYING ON BACK TO SITTING ON SIDE OF FLAT BED WITH BEDRAILS: TOTAL
PERSONAL GROOMING: TOTAL
CLIMB 3 TO 5 STEPS WITH RAILING: TOTAL
WALKING IN HOSPITAL ROOM: TOTAL
DRESSING REGULAR LOWER BODY CLOTHING: TOTAL
TURNING FROM BACK TO SIDE WHILE IN FLAT BAD: TOTAL
MOVING TO AND FROM BED TO CHAIR: TOTAL
WALKING IN HOSPITAL ROOM: TOTAL
HELP NEEDED FOR BATHING: TOTAL
TOILETING: TOTAL

## 2024-01-23 ASSESSMENT — PAIN SCALES - GENERAL: PAINLEVEL_OUTOF10: 0 - NO PAIN

## 2024-01-23 NOTE — SIGNIFICANT EVENT
I was called to the bedside to pronounce that patient in room 824 had .  No spontaneous movements were present.  There was no response to verbal or tactile stimuli.  Pupils were dilated and fixed.  Corneal reflex absent.  No breath sounds were auscultated.  No carotid pulse was palpable.  No heart sounds were auscultated over entire precordium. Patient pronounced at 1020 on 24.

## 2024-01-23 NOTE — PROGRESS NOTES
Fidelina Milton is a 83 y.o. female on day 2 of admission presenting with ARDS (adult respiratory distress syndrome) (CMS/[facility]).      Subjective   Patient is a 83 year old female on day 3 of admission presenting with shortness of breath. Patient has a history of  Hypothyroidism, asthma, hypertension, GERD, MGUS, chronic anemia, hyperlipidemia, history of COPD on BiPAP.       Objective     Last Recorded Vitals  BP (!) 88/42   Pulse 81   Temp 35.6 °C (96.1 °F)   SpO2 98%   Intake/Output last 3 Shifts:  No intake or output data in the 24 hours ending 01/23/24 1728      Admission Weight       Daily Weight  01/18/24 : (!) 43.1 kg (95 lb 0.3 oz)    Image Results  XR chest 1 view  Narrative: Interpreted By:  Siddhartha Hadley,   STUDY:  XR CHEST 1 VIEW; ;  1/19/2024 4:58 pm      INDICATION:  Signs/Symptoms:resp failure/ increasing o2 needs..      COMPARISON:  Chest x-ray of 01/16/2024 and the chest CT of 07/24/2019      ACCESSION NUMBER(S):  [id number]      ORDERING CLINICIAN:  MALENA RIZO      TECHNIQUE:  2 AP portable chest radiographs are obtained.      FINDINGS:  The lungs are overinflated. There are opacities in the bilateral  upper lobes more pronounced in the left upper lobe which correspond  to the findings noted on the prior CT scan. The appearance suggests  chronic interstitial lung disease with scarring. Underlying pneumonia  cannot be excluded. There is a small amount of pleural fluid or  thickening along the lateral left lung base. No evidence of  pneumothorax or congestive heart failure.      Impression: Overinflated lungs. Bilateral upper lobe opacities, left more than  right similar to the prior examination believed to represent  pulmonary scarring. Underlying pneumonia cannot be excluded.      Questionable minimal amount of left pleural fluid.          MACRO:  None      Signed by: Siddhartha Hadley 1/19/2024 6:08 PM  Dictation workstation:   [id number]      Physical Exam  Constitutional:       General: She is  awake.      Appearance: Normal appearance.   Cardiovascular:      Rate and Rhythm: Normal rate and regular rhythm.      Heart sounds: Normal heart sounds.   Pulmonary:      Effort: Pulmonary effort is normal.      Breath sounds: Normal breath sounds and air entry.   Abdominal:      General: Bowel sounds are normal.      Palpations: Abdomen is soft.   Neurological:      General: No focal deficit present.      Mental Status: She is alert and oriented to person, place, and time.   Psychiatric:         Attention and Perception: Attention normal.         Mood and Affect: Mood and affect normal.         Speech: Speech normal.         Behavior: Behavior normal.         Relevant Results      Scheduled medications      Continuous medications      PRN medications    No results found for this or any previous visit (from the past 24 hour(s)).             Assessment/Plan              Principal Problem:    ARDS (adult respiratory distress syndrome) (CMS/HCC)    Patient was fully evaluated on 2024. ABG and ECHO needed. Will recheck labs in AM.  Patient with A-fib with RVR converted to sinus rhythm with Cardizem.  Clinically improved.  Echocardiogram ordered.    Patient was fully evaluated on 2024. Patient is on BIPAP FIO2 40%. Will recheck labs in AM.     Patient fully evaluated on 2024. Patient alternates between BIPAP and airvo. Will recheck labs in AM.     Patient fully evaluated on 2024. Patient was saturating at 92%. Patient was noted to tachycardic. Will recheck labs in AM.        Patient  on .  Because of that acute respiratory failure       Ren Wong MD

## 2024-01-23 NOTE — CARE PLAN
The patient's goals for the shift include      The clinical goals for the shift include rest/comfort    Over the shift, the patient will maintain comfort

## 2024-01-23 NOTE — CARE PLAN
The patient's goals for the shift include        Problem: Skin  Goal: Prevent/manage excess moisture  Flowsheets (Taken 1/22/2024 1725 by Shruthi Taylor RN)  Prevent/manage excess moisture:   Moisturize dry skin   Cleanse incontinence/protect with barrier cream  Goal: Prevent/minimize sheer/friction injuries  Flowsheets (Taken 1/23/2024 6669)  Prevent/minimize sheer/friction injuries:   HOB 30 degrees or less   Turn/reposition every 2 hours/use positioning/transfer devices   Use pull sheet

## 2024-02-18 NOTE — DISCHARGE SUMMARY
Discharge Diagnosis  ARDS (adult respiratory distress syndrome) (CMS/HCC)    Issues Requiring Follow-Up  Patient  this admission under hospice GIP    Discharge Meds     Your medication list        ASK your doctor about these medications        Instructions Last Dose Given Next Dose Due   acetaminophen 325 mg tablet  Commonly known as: Tylenol      Take 2 tablets (650 mg) by mouth every 4 hours if needed for mild pain (1 - 3).       docusate sodium 100 mg capsule  Commonly known as: Colace           furosemide 40 mg tablet  Commonly known as: Lasix           ipratropium-albuteroL 0.5-2.5 mg/3 mL nebulizer solution  Commonly known as: Duo-Neb           morphine 2 mg/mL injection      Infuse 0.5 mL (1 mg) into a venous catheter every 2 hours.       oxygen gas therapy  Commonly known as: O2      Inhale 5 L/min once every 24 hours.       polyethylene glycol 17 gram/dose powder  Commonly known as: Glycolax, Miralax           Synthroid 50 mcg tablet  Generic drug: levothyroxine                    Test Results Pending At Discharge  Pending Labs       No current pending labs.            Hospital Course         Ren Wong MD  Physician  Internal Medicine     Progress Notes      Signed     Date of Service: 2024 10:20 AM     Signed       Expand All Collapse All    Fidelina Milton is a 83 y.o. female on day 2 of admission presenting with ARDS (adult respiratory distress syndrome) (CMS/HCC).           Subjective   Patient is a 83 year old female on day 3 of admission presenting with shortness of breath. Patient has a history of  Hypothyroidism, asthma, hypertension, GERD, MGUS, chronic anemia, hyperlipidemia, history of COPD on BiPAP.              Objective   Last Recorded Vitals  BP (!) 88/42   Pulse 81   Temp 35.6 °C (96.1 °F)   SpO2 98%   Intake/Output last 3 Shifts:  No intake or output data in the 24 hours ending 24 5640        Admission Weight     Daily Weight  24 : (!) 43.1 kg (95 lb 0.3  oz)     Image Results  XR chest 1 view  Narrative: Interpreted By:  Siddhartha Hadley,   STUDY:  XR CHEST 1 VIEW; ;  1/19/2024 4:58 pm      INDICATION:  Signs/Symptoms:resp failure/ increasing o2 needs..      COMPARISON:  Chest x-ray of 01/16/2024 and the chest CT of 07/24/2019      ACCESSION NUMBER(S):  [id number]      ORDERING CLINICIAN:  MALENA RIZO      TECHNIQUE:  2 AP portable chest radiographs are obtained.      FINDINGS:  The lungs are overinflated. There are opacities in the bilateral  upper lobes more pronounced in the left upper lobe which correspond  to the findings noted on the prior CT scan. The appearance suggests  chronic interstitial lung disease with scarring. Underlying pneumonia  cannot be excluded. There is a small amount of pleural fluid or  thickening along the lateral left lung base. No evidence of  pneumothorax or congestive heart failure.      Impression: Overinflated lungs. Bilateral upper lobe opacities, left more than  right similar to the prior examination believed to represent  pulmonary scarring. Underlying pneumonia cannot be excluded.      Questionable minimal amount of left pleural fluid.          MACRO:  None      Signed by: Siddhartha Hadley 1/19/2024 6:08 PM  Dictation workstation:   [id number]        Physical Exam  Constitutional:       General: She is awake.      Appearance: Normal appearance.   Cardiovascular:      Rate and Rhythm: Normal rate and regular rhythm.      Heart sounds: Normal heart sounds.   Pulmonary:      Effort: Pulmonary effort is normal.      Breath sounds: Normal breath sounds and air entry.   Abdominal:      General: Bowel sounds are normal.      Palpations: Abdomen is soft.   Neurological:      General: No focal deficit present.      Mental Status: She is alert and oriented to person, place, and time.   Psychiatric:         Attention and Perception: Attention normal.         Mood and Affect: Mood and affect normal.         Speech: Speech normal.         Behavior:  Behavior normal.            Relevant Results        Scheduled medications        Continuous medications        PRN medications     No results found for this or any previous visit (from the past 24 hour(s)).                       Assessment/Plan               Principal Problem:    ARDS (adult respiratory distress syndrome) (CMS/[facility])     Patient was fully evaluated on 2024. ABG and ECHO needed. Will recheck labs in AM.  Patient with A-fib with RVR converted to sinus rhythm with Cardizem.  Clinically improved.  Echocardiogram ordered.     Patient was fully evaluated on 2024. Patient is on BIPAP FIO2 40%. Will recheck labs in AM.      Patient fully evaluated on 2024. Patient alternates between BIPAP and airvo. Will recheck labs in AM.      Patient fully evaluated on 2024. Patient was saturating at 92%. Patient was noted to tachycardic. Will recheck labs in AM.         Patient  on .  Because of that acute respiratory failure           Ren Wong MD                         Pertinent Physical Exam At Time of Discharge  Physical Exam    Outpatient Follow-Up  No future appointments.      Ren Wong MD